# Patient Record
Sex: FEMALE | Race: WHITE | NOT HISPANIC OR LATINO
[De-identification: names, ages, dates, MRNs, and addresses within clinical notes are randomized per-mention and may not be internally consistent; named-entity substitution may affect disease eponyms.]

---

## 2017-03-23 ENCOUNTER — MEDICATION RENEWAL (OUTPATIENT)
Age: 77
End: 2017-03-23

## 2017-06-21 ENCOUNTER — MEDICATION RENEWAL (OUTPATIENT)
Age: 77
End: 2017-06-21

## 2017-09-20 ENCOUNTER — LABORATORY RESULT (OUTPATIENT)
Age: 77
End: 2017-09-20

## 2017-09-20 ENCOUNTER — APPOINTMENT (OUTPATIENT)
Dept: INTERNAL MEDICINE | Facility: CLINIC | Age: 77
End: 2017-09-20
Payer: MEDICARE

## 2017-09-20 VITALS
SYSTOLIC BLOOD PRESSURE: 124 MMHG | DIASTOLIC BLOOD PRESSURE: 70 MMHG | BODY MASS INDEX: 27.46 KG/M2 | HEIGHT: 63 IN | DIASTOLIC BLOOD PRESSURE: 72 MMHG | SYSTOLIC BLOOD PRESSURE: 118 MMHG | WEIGHT: 155 LBS

## 2017-09-20 DIAGNOSIS — R35.0 FREQUENCY OF MICTURITION: ICD-10-CM

## 2017-09-20 PROCEDURE — 36415 COLL VENOUS BLD VENIPUNCTURE: CPT

## 2017-09-20 PROCEDURE — 99214 OFFICE O/P EST MOD 30 MIN: CPT | Mod: 25

## 2017-09-20 RX ORDER — CETIRIZINE HYDROCHLORIDE 10 MG/1
10 CAPSULE, LIQUID FILLED ORAL
Refills: 0 | Status: ACTIVE | COMMUNITY

## 2017-09-22 LAB
25(OH)D3 SERPL-MCNC: 38 NG/ML
ALBUMIN SERPL ELPH-MCNC: 4 G/DL
ALP BLD-CCNC: 48 U/L
ALT SERPL-CCNC: 14 U/L
ANION GAP SERPL CALC-SCNC: 11 MMOL/L
AST SERPL-CCNC: 14 U/L
B BURGDOR IGG+IGM SER QL IB: NORMAL
BASOPHILS # BLD AUTO: 0.07 K/UL
BASOPHILS NFR BLD AUTO: 1.2 %
BILIRUB SERPL-MCNC: 0.3 MG/DL
BUN SERPL-MCNC: 13 MG/DL
CALCIUM SERPL-MCNC: 10 MG/DL
CCP AB SER IA-ACNC: <8 UNITS
CHLORIDE SERPL-SCNC: 101 MMOL/L
CHOLEST SERPL-MCNC: 251 MG/DL
CHOLEST/HDLC SERPL: 5.8 RATIO
CO2 SERPL-SCNC: 25 MMOL/L
CREAT SERPL-MCNC: 0.87 MG/DL
EOSINOPHIL # BLD AUTO: 0.22 K/UL
EOSINOPHIL NFR BLD AUTO: 3.7 %
GLUCOSE SERPL-MCNC: 90 MG/DL
HBA1C MFR BLD HPLC: 5.6 %
HCT VFR BLD CALC: 42.4 %
HDLC SERPL-MCNC: 43 MG/DL
HGB BLD-MCNC: 13.7 G/DL
IMM GRANULOCYTES NFR BLD AUTO: 0.2 %
LDLC SERPL CALC-MCNC: 177 MG/DL
LYMPHOCYTES # BLD AUTO: 1.41 K/UL
LYMPHOCYTES NFR BLD AUTO: 23.8 %
MAN DIFF?: NORMAL
MCHC RBC-ENTMCNC: 29.5 PG
MCHC RBC-ENTMCNC: 32.3 GM/DL
MCV RBC AUTO: 91.2 FL
MONOCYTES # BLD AUTO: 0.54 K/UL
MONOCYTES NFR BLD AUTO: 9.1 %
NEUTROPHILS # BLD AUTO: 3.68 K/UL
NEUTROPHILS NFR BLD AUTO: 62 %
PLATELET # BLD AUTO: 236 K/UL
POTASSIUM SERPL-SCNC: 4.3 MMOL/L
PROT SERPL-MCNC: 7 G/DL
RBC # BLD: 4.65 M/UL
RBC # FLD: 15.1 %
RF+CCP IGG SER-IMP: NEGATIVE
RHEUMATOID FACT SER QL: <7 IU/ML
SAVE SPECIMEN: NORMAL
SODIUM SERPL-SCNC: 137 MMOL/L
T3RU NFR SERPL: 1.07 INDEX
T4 SERPL-MCNC: 8.2 UG/DL
TRIGL SERPL-MCNC: 156 MG/DL
TSH SERPL-ACNC: 3.42 UIU/ML
URATE SERPL-MCNC: 5.2 MG/DL
WBC # FLD AUTO: 5.93 K/UL

## 2017-10-16 ENCOUNTER — MEDICATION RENEWAL (OUTPATIENT)
Age: 77
End: 2017-10-16

## 2017-10-17 ENCOUNTER — RX RENEWAL (OUTPATIENT)
Age: 77
End: 2017-10-17

## 2017-10-26 ENCOUNTER — MED ADMIN CHARGE (OUTPATIENT)
Age: 77
End: 2017-10-26

## 2017-10-26 ENCOUNTER — APPOINTMENT (OUTPATIENT)
Dept: INTERNAL MEDICINE | Facility: CLINIC | Age: 77
End: 2017-10-26
Payer: MEDICARE

## 2017-10-26 PROCEDURE — 90686 IIV4 VACC NO PRSV 0.5 ML IM: CPT

## 2017-10-26 PROCEDURE — G0008: CPT

## 2017-11-03 ENCOUNTER — APPOINTMENT (OUTPATIENT)
Dept: INTERNAL MEDICINE | Facility: CLINIC | Age: 77
End: 2017-11-03

## 2017-12-06 ENCOUNTER — MEDICATION RENEWAL (OUTPATIENT)
Age: 77
End: 2017-12-06

## 2018-01-30 ENCOUNTER — MEDICATION RENEWAL (OUTPATIENT)
Age: 78
End: 2018-01-30

## 2018-02-13 ENCOUNTER — RECORD ABSTRACTING (OUTPATIENT)
Age: 78
End: 2018-02-13

## 2018-02-13 DIAGNOSIS — Z87.09 PERSONAL HISTORY OF OTHER DISEASES OF THE RESPIRATORY SYSTEM: ICD-10-CM

## 2018-02-13 DIAGNOSIS — Z87.898 PERSONAL HISTORY OF OTHER SPECIFIED CONDITIONS: ICD-10-CM

## 2018-02-23 ENCOUNTER — NON-APPOINTMENT (OUTPATIENT)
Age: 78
End: 2018-02-23

## 2018-02-23 ENCOUNTER — LABORATORY RESULT (OUTPATIENT)
Age: 78
End: 2018-02-23

## 2018-02-23 ENCOUNTER — APPOINTMENT (OUTPATIENT)
Dept: INTERNAL MEDICINE | Facility: CLINIC | Age: 78
End: 2018-02-23
Payer: MEDICARE

## 2018-02-23 ENCOUNTER — MEDICATION RENEWAL (OUTPATIENT)
Age: 78
End: 2018-02-23

## 2018-02-23 VITALS
DIASTOLIC BLOOD PRESSURE: 70 MMHG | BODY MASS INDEX: 28.7 KG/M2 | SYSTOLIC BLOOD PRESSURE: 122 MMHG | WEIGHT: 162 LBS | HEIGHT: 63 IN

## 2018-02-23 VITALS — SYSTOLIC BLOOD PRESSURE: 120 MMHG | DIASTOLIC BLOOD PRESSURE: 60 MMHG

## 2018-02-23 DIAGNOSIS — Z00.00 ENCOUNTER FOR GENERAL ADULT MEDICAL EXAMINATION W/OUT ABNORMAL FINDINGS: ICD-10-CM

## 2018-02-23 LAB
BASOPHILS # BLD AUTO: 0.09 K/UL
BASOPHILS NFR BLD AUTO: 1.5 %
BILIRUB UR QL STRIP: NORMAL
CLARITY UR: CLEAR
COLLECTION METHOD: NORMAL
EOSINOPHIL # BLD AUTO: 0.11 K/UL
EOSINOPHIL NFR BLD AUTO: 1.9 %
GLUCOSE UR-MCNC: NORMAL
HCG UR QL: 0.2 EU/DL
HCT VFR BLD CALC: 43.3 %
HGB BLD-MCNC: 13.6 G/DL
HGB UR QL STRIP.AUTO: NORMAL
IMM GRANULOCYTES NFR BLD AUTO: 0.3 %
KETONES UR-MCNC: NORMAL
LEUKOCYTE ESTERASE UR QL STRIP: NORMAL
LYMPHOCYTES # BLD AUTO: 1.36 K/UL
LYMPHOCYTES NFR BLD AUTO: 22.9 %
MAN DIFF?: NORMAL
MCHC RBC-ENTMCNC: 28.5 PG
MCHC RBC-ENTMCNC: 31.4 GM/DL
MCV RBC AUTO: 90.8 FL
MONOCYTES # BLD AUTO: 0.45 K/UL
MONOCYTES NFR BLD AUTO: 7.6 %
NEUTROPHILS # BLD AUTO: 3.9 K/UL
NEUTROPHILS NFR BLD AUTO: 65.8 %
NITRITE UR QL STRIP: NORMAL
PH UR STRIP: 6
PLATELET # BLD AUTO: 243 K/UL
PROT UR STRIP-MCNC: NORMAL
RBC # BLD: 4.77 M/UL
RBC # FLD: 15.1 %
SP GR UR STRIP: 1.01
WBC # FLD AUTO: 5.93 K/UL

## 2018-02-23 PROCEDURE — 81003 URINALYSIS AUTO W/O SCOPE: CPT | Mod: QW

## 2018-02-23 PROCEDURE — 36415 COLL VENOUS BLD VENIPUNCTURE: CPT

## 2018-02-23 PROCEDURE — 93000 ELECTROCARDIOGRAM COMPLETE: CPT

## 2018-02-23 PROCEDURE — G0439: CPT | Mod: GA

## 2018-02-23 RX ORDER — DULOXETINE HYDROCHLORIDE 30 MG/1
30 CAPSULE, DELAYED RELEASE PELLETS ORAL
Qty: 90 | Refills: 0 | Status: DISCONTINUED | COMMUNITY
Start: 2017-09-20 | End: 2018-02-23

## 2018-02-24 LAB
25(OH)D3 SERPL-MCNC: 35.8 NG/ML
ALBUMIN SERPL ELPH-MCNC: 4.2 G/DL
ALP BLD-CCNC: 49 U/L
ALT SERPL-CCNC: 19 U/L
ANION GAP SERPL CALC-SCNC: 15 MMOL/L
AST SERPL-CCNC: 18 U/L
BILIRUB SERPL-MCNC: 0.4 MG/DL
BUN SERPL-MCNC: 16 MG/DL
CALCIUM SERPL-MCNC: 9.7 MG/DL
CHLORIDE SERPL-SCNC: 101 MMOL/L
CHOLEST SERPL-MCNC: 259 MG/DL
CHOLEST/HDLC SERPL: 6.6 RATIO
CO2 SERPL-SCNC: 23 MMOL/L
CREAT SERPL-MCNC: 0.91 MG/DL
GLUCOSE SERPL-MCNC: 116 MG/DL
HBA1C MFR BLD HPLC: 5.6 %
HDLC SERPL-MCNC: 39 MG/DL
LDLC SERPL CALC-MCNC: 182 MG/DL
POTASSIUM SERPL-SCNC: 4.6 MMOL/L
PROT SERPL-MCNC: 6.9 G/DL
SAVE SPECIMEN: NORMAL
SODIUM SERPL-SCNC: 139 MMOL/L
T3RU NFR SERPL: 1 INDEX
T4 SERPL-MCNC: 7.6 UG/DL
TRIGL SERPL-MCNC: 189 MG/DL
TSH SERPL-ACNC: 2.28 UIU/ML
URATE SERPL-MCNC: 5.5 MG/DL

## 2018-02-26 LAB — VIT B12 SERPL-MCNC: >2000 PG/ML

## 2018-03-19 ENCOUNTER — MEDICATION RENEWAL (OUTPATIENT)
Age: 78
End: 2018-03-19

## 2018-05-25 ENCOUNTER — MEDICATION RENEWAL (OUTPATIENT)
Age: 78
End: 2018-05-25

## 2018-07-16 ENCOUNTER — MEDICATION RENEWAL (OUTPATIENT)
Age: 78
End: 2018-07-16

## 2018-08-30 ENCOUNTER — APPOINTMENT (OUTPATIENT)
Dept: INTERNAL MEDICINE | Facility: CLINIC | Age: 78
End: 2018-08-30
Payer: MEDICARE

## 2018-08-30 VITALS
TEMPERATURE: 98.2 F | SYSTOLIC BLOOD PRESSURE: 150 MMHG | WEIGHT: 165 LBS | HEIGHT: 63 IN | DIASTOLIC BLOOD PRESSURE: 83 MMHG | BODY MASS INDEX: 29.23 KG/M2 | HEART RATE: 91 BPM

## 2018-08-30 DIAGNOSIS — J32.9 CHRONIC SINUSITIS, UNSPECIFIED: ICD-10-CM

## 2018-08-30 PROCEDURE — 99213 OFFICE O/P EST LOW 20 MIN: CPT

## 2018-08-30 NOTE — PHYSICAL EXAM
[No Acute Distress] : no acute distress [Well Nourished] : well nourished [Well Developed] : well developed [Well-Appearing] : well-appearing [No Respiratory Distress] : no respiratory distress  [Clear to Auscultation] : lungs were clear to auscultation bilaterally [No Accessory Muscle Use] : no accessory muscle use [Normal Rate] : normal rate  [Regular Rhythm] : with a regular rhythm [Normal S1, S2] : normal S1 and S2 [No Murmur] : no murmur heard [Soft] : abdomen soft [Non Tender] : non-tender [Non-distended] : non-distended [No Masses] : no abdominal mass palpated [No HSM] : no HSM [Normal Bowel Sounds] : normal bowel sounds [Normal Affect] : the affect was normal [Normal Insight/Judgement] : insight and judgment were intact [Normal Outer Ear/Nose] : the outer ears and nose were normal in appearance [Normal Oropharynx] : the oropharynx was normal

## 2018-08-30 NOTE — HISTORY OF PRESENT ILLNESS
[FreeTextEntry8] : 77 year old female here today with complaints of 2 weeks of feeling like something is stuck in her throat and a constant post nasal drip. She has a very bad taste in her mouth and smell in her nose. She has been using Vicks because her nose smells.   Denies fevers.  Denies chills. Denies body aches. She has not taken anything OTC.  Advil she takes at night for her back pain. \par She has a mild productive cough but that started a few days ago. She has been taking Flonase. \par She doesn’t want to take medications. SHe is concerned with GI.  \par

## 2018-08-30 NOTE — ASSESSMENT
[FreeTextEntry1] : Sinusitis: does not want antibiotics or to take medications but recommend OTC agents like mucinex and Flonase\par

## 2018-08-30 NOTE — REVIEW OF SYSTEMS
[Negative] : Gastrointestinal [Nasal Discharge] : nasal discharge [Sore Throat] : no sore throat [Postnasal Drip] : postnasal drip

## 2018-10-01 ENCOUNTER — MEDICATION RENEWAL (OUTPATIENT)
Age: 78
End: 2018-10-01

## 2018-12-29 ENCOUNTER — TRANSCRIPTION ENCOUNTER (OUTPATIENT)
Age: 78
End: 2018-12-29

## 2019-02-04 ENCOUNTER — MEDICATION RENEWAL (OUTPATIENT)
Age: 79
End: 2019-02-04

## 2019-02-11 ENCOUNTER — MEDICATION RENEWAL (OUTPATIENT)
Age: 79
End: 2019-02-11

## 2019-03-29 ENCOUNTER — LABORATORY RESULT (OUTPATIENT)
Age: 79
End: 2019-03-29

## 2019-03-29 ENCOUNTER — NON-APPOINTMENT (OUTPATIENT)
Age: 79
End: 2019-03-29

## 2019-03-29 ENCOUNTER — APPOINTMENT (OUTPATIENT)
Dept: INTERNAL MEDICINE | Facility: CLINIC | Age: 79
End: 2019-03-29
Payer: MEDICARE

## 2019-03-29 VITALS
WEIGHT: 163 LBS | SYSTOLIC BLOOD PRESSURE: 120 MMHG | HEIGHT: 63 IN | DIASTOLIC BLOOD PRESSURE: 70 MMHG | BODY MASS INDEX: 28.88 KG/M2

## 2019-03-29 VITALS — DIASTOLIC BLOOD PRESSURE: 80 MMHG | SYSTOLIC BLOOD PRESSURE: 120 MMHG

## 2019-03-29 VITALS — SYSTOLIC BLOOD PRESSURE: 120 MMHG | DIASTOLIC BLOOD PRESSURE: 70 MMHG

## 2019-03-29 DIAGNOSIS — G47.00 INSOMNIA, UNSPECIFIED: ICD-10-CM

## 2019-03-29 PROCEDURE — G0439: CPT

## 2019-03-29 PROCEDURE — 36415 COLL VENOUS BLD VENIPUNCTURE: CPT

## 2019-03-29 PROCEDURE — 81003 URINALYSIS AUTO W/O SCOPE: CPT | Mod: QW

## 2019-03-29 PROCEDURE — 93000 ELECTROCARDIOGRAM COMPLETE: CPT | Mod: 59

## 2019-03-29 PROCEDURE — 99213 OFFICE O/P EST LOW 20 MIN: CPT | Mod: 25

## 2019-03-29 RX ORDER — AZITHROMYCIN 250 MG/1
250 TABLET, FILM COATED ORAL
Qty: 5 | Refills: 0 | Status: DISCONTINUED | COMMUNITY
Start: 2018-08-30 | End: 2019-03-29

## 2019-03-29 NOTE — ASSESSMENT
[FreeTextEntry1] : This is a 78-year-old female reviewed above\par \par She has a history of chronic joint pain mostly in her back in the coccyx region and in her fingers. She has been worked up serologically and has been negative she refuses medication. I will suggest a rheumatologic consultation\par \par She has had a increase in her fatigability this is rather nonspecific her examination and the rest of her history is benign no intervention\par \par She has had a school change in her bowel habits she is guaiac negative she had a colonoscopy 2 years ago. I told her if this persists or becomes worse to go back to her gastroenterologist\par \par She is up-to-date with vaccinations except that she needs any new shingle shot\par \par She has a history of hypercholesterolemia the cholesterol profile has been obtained she is intolerant and 78 she has had no evidence of vascular disease no intervention We could consider Zetia if necessary\par \par In terms of her insomnia she will continue on her current regime\par \par She should get a new shingle shot\par \par I will hold her she continues on her PPI with good success no change\par \par He did have a rash in the past this has dissipated she remains on Zyrtec

## 2019-03-29 NOTE — HISTORY OF PRESENT ILLNESS
[FreeTextEntry1] : This is a 78-year-old female for annual health assessment [de-identified] : Specifically we will address her history of hypercholesterolemia reflux lumbar radiculopathy moderate white regurgitation statin intolerance\par \par She has had a boiled change in bowel habits and also is complaining of more easy fatigability without chest pain shortness of breath or diaphoresis

## 2019-03-29 NOTE — PHYSICAL EXAM

## 2019-03-29 NOTE — HEALTH RISK ASSESSMENT
[Good] : ~his/her~  mood as  good [No falls in past year] : Patient reported no falls in the past year [0] : 1) Little interest or pleasure doing things: Not at all (0) [With Significant Other] : lives with significant other [Retired] : retired [High School] : high school [Significant Other] : lives with significant other [Feels Safe at Home] : Feels safe at home [Fully functional (bathing, dressing, toileting, transferring, walking, feeding)] : Fully functional (bathing, dressing, toileting, transferring, walking, feeding) [Fully functional (using the telephone, shopping, preparing meals, housekeeping, doing laundry, using] : Fully functional and needs no help or supervision to perform IADLs (using the telephone, shopping, preparing meals, housekeeping, doing laundry, using transportation, managing medications and managing finances) [Smoke Detector] : smoke detector [Carbon Monoxide Detector] : carbon monoxide detector [Seat Belt] :  uses seat belt [Sunscreen] : uses sunscreen [I will adhere to the patient's wishes as expressed in the advance directive except as noted below.] : I will adhere to the patient's wishes as expressed in the advance directive except as noted below [] : No [Sexually Active] : not sexually active [Reports changes in hearing] : Reports no changes in hearing [Reports changes in vision] : Reports no changes in vision [Reports changes in dental health] : Reports no changes in dental health [Guns at Home] : no guns at home [Travel to Developing Areas] : does not  travel to developing areas [TB Exposure] : is not being exposed to tuberculosis [Caregiver Concerns] : does not have caregiver concerns [FreeTextEntry4] : Daughter is health care proxy

## 2019-04-01 LAB
ALBUMIN SERPL ELPH-MCNC: 4.2 G/DL
ALP BLD-CCNC: 50 U/L
ALT SERPL-CCNC: 18 U/L
ANION GAP SERPL CALC-SCNC: 15 MMOL/L
AST SERPL-CCNC: 17 U/L
BASOPHILS # BLD AUTO: 0.11 K/UL
BASOPHILS NFR BLD AUTO: 1.9 %
BILIRUB SERPL-MCNC: 0.4 MG/DL
BUN SERPL-MCNC: 15 MG/DL
CALCIUM SERPL-MCNC: 10.1 MG/DL
CHLORIDE SERPL-SCNC: 102 MMOL/L
CHOLEST SERPL-MCNC: 229 MG/DL
CHOLEST/HDLC SERPL: 6.4 RATIO
CO2 SERPL-SCNC: 26 MMOL/L
CREAT SERPL-MCNC: 0.87 MG/DL
EOSINOPHIL # BLD AUTO: 0.11 K/UL
EOSINOPHIL NFR BLD AUTO: 1.9 %
ESTIMATED AVERAGE GLUCOSE: 123 MG/DL
GLUCOSE SERPL-MCNC: 91 MG/DL
HBA1C MFR BLD HPLC: 5.9 %
HCT VFR BLD CALC: 45.7 %
HDLC SERPL-MCNC: 36 MG/DL
HGB BLD-MCNC: 14.1 G/DL
IMM GRANULOCYTES NFR BLD AUTO: 0.2 %
LDLC SERPL CALC-MCNC: 167 MG/DL
LYMPHOCYTES # BLD AUTO: 1.07 K/UL
LYMPHOCYTES NFR BLD AUTO: 18.4 %
MAN DIFF?: NORMAL
MCHC RBC-ENTMCNC: 28.6 PG
MCHC RBC-ENTMCNC: 30.9 GM/DL
MCV RBC AUTO: 92.7 FL
MONOCYTES # BLD AUTO: 0.51 K/UL
MONOCYTES NFR BLD AUTO: 8.8 %
NEUTROPHILS # BLD AUTO: 4 K/UL
NEUTROPHILS NFR BLD AUTO: 68.8 %
PLATELET # BLD AUTO: 259 K/UL
POTASSIUM SERPL-SCNC: 4.7 MMOL/L
PROT SERPL-MCNC: 6.8 G/DL
RBC # BLD: 4.93 M/UL
RBC # FLD: 14 %
SAVE SPECIMEN: NORMAL
SODIUM SERPL-SCNC: 142 MMOL/L
T3RU NFR SERPL: 1 TBI
T4 SERPL-MCNC: 8.2 UG/DL
TRIGL SERPL-MCNC: 129 MG/DL
TSH SERPL-ACNC: 2.12 UIU/ML
URATE SERPL-MCNC: 5.5 MG/DL
WBC # FLD AUTO: 5.81 K/UL

## 2019-04-02 LAB — 25(OH)D3 SERPL-MCNC: 42.9 NG/ML

## 2019-05-15 ENCOUNTER — MEDICATION RENEWAL (OUTPATIENT)
Age: 79
End: 2019-05-15

## 2019-06-20 ENCOUNTER — MEDICATION RENEWAL (OUTPATIENT)
Age: 79
End: 2019-06-20

## 2019-06-21 ENCOUNTER — MEDICATION RENEWAL (OUTPATIENT)
Age: 79
End: 2019-06-21

## 2019-07-26 ENCOUNTER — MEDICATION RENEWAL (OUTPATIENT)
Age: 79
End: 2019-07-26

## 2019-08-19 ENCOUNTER — MEDICATION RENEWAL (OUTPATIENT)
Age: 79
End: 2019-08-19

## 2019-08-22 ENCOUNTER — MEDICATION RENEWAL (OUTPATIENT)
Age: 79
End: 2019-08-22

## 2019-10-11 ENCOUNTER — CLINICAL ADVICE (OUTPATIENT)
Age: 79
End: 2019-10-11

## 2019-10-16 ENCOUNTER — APPOINTMENT (OUTPATIENT)
Dept: ORTHOPEDIC SURGERY | Facility: CLINIC | Age: 79
End: 2019-10-16
Payer: MEDICARE

## 2019-10-16 VITALS
WEIGHT: 156 LBS | SYSTOLIC BLOOD PRESSURE: 141 MMHG | HEART RATE: 84 BPM | DIASTOLIC BLOOD PRESSURE: 88 MMHG | HEIGHT: 63 IN | BODY MASS INDEX: 27.64 KG/M2

## 2019-10-16 DIAGNOSIS — F19.90 OTHER PSYCHOACTIVE SUBSTANCE USE, UNSPECIFIED, UNCOMPLICATED: ICD-10-CM

## 2019-10-16 PROCEDURE — 72100 X-RAY EXAM L-S SPINE 2/3 VWS: CPT

## 2019-10-16 PROCEDURE — 99204 OFFICE O/P NEW MOD 45 MIN: CPT

## 2019-10-16 NOTE — PHYSICAL EXAM
[UE/LE] : Sensory: Intact in bilateral upper & lower extremities [ALL] : dorsalis pedis, posterior tibial, femoral, popliteal, and radial 2+ and symmetric bilaterally [Normal] : Gait: normal [SLR] : negative straight leg raise [Poor Appearance] : well-appearing [Acute Distress] : not in acute distress [de-identified] : 5 out of 5 motor strength, sensation is intact and symmetrical full range of motion flexion extension and rotation, no palpatory tenderness full range of motion of hips knees shoulders and elbows (all four extremities), no atrophy, negative straight leg raise, no pathological reflexes, no swelling, normal ambulation, no apparent distress skin is intact, no palpable lymph nodes, no upper or lower extremity instability, alert and oriented x3 and normal mood. Normal finger-to nose test. \par  [de-identified] : AP/lat lumbar 10/16/2019: Spondylolisthesis at L4/5 -reviewed with the patient. \par \par MRI lumbar 2015 at [facility]: Broad posterior disc herniation at L1-2 and L4-5 and there is a focal central component of he herniation L4-5. Posterior annular bulges L2-3, L3-4 and L5-S1. 1-2 mm retrolisthesis at L1-2 and L2-3 and there is a 1-2 mm anterolisthesis at l4-5. \par \par

## 2019-10-16 NOTE — DISCUSSION/SUMMARY
[de-identified] : Right-sided lumbar radiculopathy.\par We discussed all options. \par MDP. \par If no better in 2 weeks, will send for new MRI lumbar. \par All options discussed including rest, medicine, home exercise, acupuncture, Chiropractic care, Physical Therapy, Pain management, and last resort surgery. \par All questions were answered, all alternatives discussed and the patient is in complete agreement with that plan. Follow-up appointment as instructed. Any issues and the patient will call or come in sooner. \par Thank you for the referral.

## 2019-10-16 NOTE — ADDENDUM
[FreeTextEntry1] :  This note was authored by Aparna Wilcox working as a medical scribe for Dr. Osmar Newman. The note was reviewed, edited, and revised by Dr. Osmar Newman whom is in agreement with the exam findings, imaging findings, and treatment plan. Oct 16, 2019

## 2019-10-16 NOTE — HISTORY OF PRESENT ILLNESS
[6] : an average pain level of 6/10 [Pain Location] : pain [Stable] : stable [de-identified] : Patient is a 78 year old female presents with history low back pain for about 51 years. Patient reports the low back pain/"coccyx" pain exacerbated 2 weeks ago. \par Has been pedaling stationary bike 2x/day for about 2 weeks, which she thinks might have exacerbated the last flare-up. \par Pain radiates down the right leg. \par Moving around, cooking, walking up and down the stairs worsen the pain.\par Takes advil for pain as needed, which helps temporarily.\par Also reports tingling in the right leg that comes and goes. \par She also reports history of neck pain and thoracic-back pain. \par No prior PT.\par No prior lumbar injections. \par Had cervical spine injections about 10 yrs ago, which helped. \par X-ray April 2015. \par Prior MRI lumbar 2015. \par No fever chills sweats nausea vomiting no bowel or bladder dysfunction, no recent weight loss or gain no night pain. This history is in addition to the intake form that I personally reviewed.

## 2019-10-28 ENCOUNTER — OTHER (OUTPATIENT)
Age: 79
End: 2019-10-28

## 2019-10-30 ENCOUNTER — MEDICATION RENEWAL (OUTPATIENT)
Age: 79
End: 2019-10-30

## 2019-10-30 ENCOUNTER — OTHER (OUTPATIENT)
Age: 79
End: 2019-10-30

## 2019-11-04 ENCOUNTER — APPOINTMENT (OUTPATIENT)
Dept: ORTHOPEDIC SURGERY | Facility: CLINIC | Age: 79
End: 2019-11-04
Payer: MEDICARE

## 2019-11-04 ENCOUNTER — RX RENEWAL (OUTPATIENT)
Age: 79
End: 2019-11-04

## 2019-11-04 PROCEDURE — 99214 OFFICE O/P EST MOD 30 MIN: CPT

## 2019-11-04 NOTE — DISCUSSION/SUMMARY
[de-identified] : Right-sided lumbar radiculopathy.\par We discussed all options. \par Voltaren and PT\par If no better in 3-4 weeks, injection.  \par All options discussed including rest, medicine, home exercise, acupuncture, Chiropractic care, Physical Therapy, Pain management, and last resort surgery. \par All questions were answered, all alternatives discussed and the patient is in complete agreement with that plan. Follow-up appointment as instructed. Any issues and the patient will call or come in sooner. \par Thank you for the referral.

## 2019-11-04 NOTE — HISTORY OF PRESENT ILLNESS
[Pain Location] : pain [Stable] : stable [6] : an average pain level of 6/10 [de-identified] : 78 year old female returns for MRI results lumbar spine \par Medrol Dose Leonard prescribed previous visit no relief \par Pain is sever, excruciating for the past 48 hrs \par Moving around, cooking, walking up and down the stairs worsen the pain.\par Takes advil for pain every 4 hrs, no rlief \par Also reports tingling in the right leg that comes and goes. \par She also reports history of neck pain and thoracic-back pain. \par No prior PT.\par No prior lumbar injections. \par Had cervical spine injections about 10 yrs ago, which helped. \par X-ray April 2015. \par Prior MRI lumbar 2015. \par No fever chills sweats nausea vomiting no bowel or bladder dysfunction, no recent weight loss or gain no night pain. This history is in addition to the intake form that I personally reviewed.

## 2019-11-04 NOTE — PHYSICAL EXAM
[UE/LE] : Sensory: Intact in bilateral upper & lower extremities [ALL] : dorsalis pedis, posterior tibial, femoral, popliteal, and radial 2+ and symmetric bilaterally [Normal] : Oriented to person, place, and time, insight and judgement were intact and the affect was normal [SLR] : negative straight leg raise [Poor Appearance] : well-appearing [Acute Distress] : not in acute distress [de-identified] : 5 out of 5 motor strength, sensation is intact and symmetrical full range of motion flexion extension and rotation, no palpatory tenderness full range of motion of hips knees shoulders and elbows (all four extremities), no atrophy, negative straight leg raise, no pathological reflexes, no swelling, normal ambulation, no apparent distress skin is intact, no palpable lymph nodes, no upper or lower extremity instability, alert and oriented x3 and normal mood. Normal finger-to nose test. \par  [de-identified] : MRI lumbar spine  11/02/2019 [facility] shows:\par 1) broad posterior disc herniations at L1/2 and L4/L5 and there is a focal central component of the disc herniation L4/L5\par 2) POsterior annular disc bulges L2/L#, L3/L4 and L5/S1, Shallow peripheral left-side disc herniation L5/S1 with left foraminal extension and narrowing. Multilevel foraminal extension and narrowing with nerve root encroachment as enumerated \par 3) 1-2 mm retrolisthesis at L1/L2 and L2/L# and there is a 1-2 mm anterolisthesis at L4/L%. \par Scoliosis convex to the left. \par \par See below.\par \par \par \par \par AP/lat lumbar 10/16/2019: Spondylolisthesis at L4/5 -reviewed with the patient. \par \par MRI lumbar 2015 at [facility]: Broad posterior disc herniation at L1-2 and L4-5 and there is a focal central component of he herniation L4-5. Posterior annular bulges L2-3, L3-4 and L5-S1. 1-2 mm retrolisthesis at L1-2 and L2-3 and there is a 1-2 mm anterolisthesis at L4-5. \par \par

## 2019-11-04 NOTE — REASON FOR VISIT
[Initial Consultation] : an initial consultation for [Back Pain] : back pain [Follow-Up Visit] : a follow-up visit for

## 2019-11-13 ENCOUNTER — CLINICAL ADVICE (OUTPATIENT)
Age: 79
End: 2019-11-13

## 2019-11-15 ENCOUNTER — CHART COPY (OUTPATIENT)
Age: 79
End: 2019-11-15

## 2019-12-03 ENCOUNTER — APPOINTMENT (OUTPATIENT)
Dept: NEUROSURGERY | Facility: CLINIC | Age: 79
End: 2019-12-03

## 2019-12-04 ENCOUNTER — OTHER (OUTPATIENT)
Age: 79
End: 2019-12-04

## 2020-02-17 ENCOUNTER — EMERGENCY (EMERGENCY)
Facility: HOSPITAL | Age: 80
LOS: 1 days | Discharge: HOME | End: 2020-02-17
Attending: STUDENT IN AN ORGANIZED HEALTH CARE EDUCATION/TRAINING PROGRAM
Payer: MEDICARE

## 2020-02-17 VITALS
RESPIRATION RATE: 18 BRPM | HEART RATE: 89 BPM | DIASTOLIC BLOOD PRESSURE: 75 MMHG | WEIGHT: 160.06 LBS | OXYGEN SATURATION: 97 % | SYSTOLIC BLOOD PRESSURE: 135 MMHG | TEMPERATURE: 97 F | HEIGHT: 63 IN

## 2020-02-17 LAB
APPEARANCE UR: ABNORMAL
BACTERIA # UR AUTO: ABNORMAL
BILIRUB UR-MCNC: NEGATIVE — SIGNIFICANT CHANGE UP
COLOR SPEC: YELLOW — SIGNIFICANT CHANGE UP
DIFF PNL FLD: NEGATIVE — SIGNIFICANT CHANGE UP
EPI CELLS # UR: 18 /HPF — HIGH
GLUCOSE UR QL: NEGATIVE — SIGNIFICANT CHANGE UP
HYALINE CASTS # UR AUTO: 7 /LPF — HIGH (ref 0–2)
KETONES UR-MCNC: NEGATIVE — SIGNIFICANT CHANGE UP
LEUKOCYTE ESTERASE UR-ACNC: ABNORMAL
NITRITE UR-MCNC: POSITIVE
PH UR: 6 — SIGNIFICANT CHANGE UP (ref 5–8)
PROT UR-MCNC: ABNORMAL
RBC CASTS # UR COMP ASSIST: 4 /HPF — SIGNIFICANT CHANGE UP (ref 0–4)
SP GR SPEC: 1.02 — SIGNIFICANT CHANGE UP (ref 1.01–1.02)
UROBILINOGEN FLD QL: NEGATIVE — SIGNIFICANT CHANGE UP
WBC UR QL: 26 /HPF — HIGH (ref 0–5)

## 2020-02-17 PROCEDURE — 81001 URINALYSIS AUTO W/SCOPE: CPT

## 2020-02-17 PROCEDURE — 99283 EMERGENCY DEPT VISIT LOW MDM: CPT

## 2020-02-17 PROCEDURE — 73562 X-RAY EXAM OF KNEE 3: CPT | Mod: 26,RT

## 2020-02-17 PROCEDURE — 87186 SC STD MICRODIL/AGAR DIL: CPT

## 2020-02-17 PROCEDURE — 73562 X-RAY EXAM OF KNEE 3: CPT

## 2020-02-17 PROCEDURE — 87086 URINE CULTURE/COLONY COUNT: CPT

## 2020-02-17 RX ORDER — NITROFURANTOIN MACROCRYSTAL 50 MG
1 CAPSULE ORAL
Qty: 10 | Refills: 0
Start: 2020-02-17 | End: 2020-02-21

## 2020-02-17 RX ORDER — DIAZEPAM 5 MG
5 TABLET ORAL ONCE
Refills: 0 | Status: DISCONTINUED | OUTPATIENT
Start: 2020-02-17 | End: 2020-02-17

## 2020-02-17 RX ORDER — NITROFURANTOIN MACROCRYSTAL 50 MG
100 CAPSULE ORAL ONCE
Refills: 0 | Status: COMPLETED | OUTPATIENT
Start: 2020-02-17 | End: 2020-02-17

## 2020-02-17 RX ORDER — ACETAMINOPHEN 500 MG
975 TABLET ORAL ONCE
Refills: 0 | Status: COMPLETED | OUTPATIENT
Start: 2020-02-17 | End: 2020-02-17

## 2020-02-17 RX ORDER — DIAZEPAM 5 MG
1 TABLET ORAL
Qty: 8 | Refills: 0
Start: 2020-02-17 | End: 2020-02-18

## 2020-02-17 RX ORDER — ONDANSETRON 8 MG/1
4 TABLET, FILM COATED ORAL ONCE
Refills: 0 | Status: COMPLETED | OUTPATIENT
Start: 2020-02-17 | End: 2020-02-17

## 2020-02-17 RX ORDER — LIDOCAINE 4 G/100G
1 CREAM TOPICAL ONCE
Refills: 0 | Status: COMPLETED | OUTPATIENT
Start: 2020-02-17 | End: 2020-02-17

## 2020-02-17 RX ADMIN — Medication 975 MILLIGRAM(S): at 19:56

## 2020-02-17 RX ADMIN — Medication 100 MILLIGRAM(S): at 22:13

## 2020-02-17 RX ADMIN — Medication 5 MILLIGRAM(S): at 19:56

## 2020-02-17 RX ADMIN — ONDANSETRON 4 MILLIGRAM(S): 8 TABLET, FILM COATED ORAL at 19:57

## 2020-02-17 NOTE — ED PROVIDER NOTE - PATIENT PORTAL LINK FT
You can access the FollowMyHealth Patient Portal offered by [facility] by registering at the following website: [email or web address]. By joining [facility]’s FollowMyHealth portal, you will also be able to view your health information using other applications (apps) compatible with our system.

## 2020-02-17 NOTE — ED PROVIDER NOTE - NSFOLLOWUPINSTRUCTIONS_ED_ALL_ED_FT
- stay hydrated.   - take tylenol 975mg and ibuprofen 600mg every 6 hours as needed for pain-take with meals.  -take valium 5 mg every 6 hours as needed for severe pain----take caution as this can increase your risk of falling, DO NOT take before driving or with pain medications.        ---do no take neproxen or metaxalone if you are taking valium/motrin.----  - follow up with your pcp in 1-2 days.    - follow up with our spine center at [phone] to make an appointment this week, or follow up with your spine specialist    - return if symptoms worsen, fever, weakness, numbness/tingling, blurred vision, difficulty ambulating and all other concerns. - stay hydrated.   - take tylenol 975mg every 6 hours and naproxen 250 mg every 12 hours as needed for pain-take with meals.  -take valium 5 mg every 6 hours as needed for severe pain----take caution as this can increase your risk of falling, DO NOT take before driving or with pain medications.        --- metaxalone if you are taking valium, do not take your alprazolam if you are taking valium---    - follow up with your pcp in 1-2 days.    - follow up with our spine center at [phone] to make an appointment this week, or follow up with your spine specialist this week.     - return if symptoms worsen, fever, weakness, numbness/tingling, blurred vision, difficulty ambulating and all other concerns. - stay hydrated.   - take tylenol 975mg every 6 hours and naproxen 250 mg every 12 hours as needed for pain-take with meals.  -take valium 5 mg every 6 hours as needed for severe pain----take caution as this can increase your risk of falling, DO NOT take before driving or with pain medications.        --- metaxalone if you are taking valium, do not take your alprazolam if you are taking valium---    -take macrobid as prescribed for UTI    - follow up with your pcp in 1-2 days.    - follow up with our spine center at [phone] to make an appointment this week, or follow up with your spine specialist this week.     - return if symptoms worsen, fever, weakness, numbness/tingling, blurred vision, difficulty ambulating and all other concerns.

## 2020-02-17 NOTE — ED PROVIDER NOTE - ATTENDING CONTRIBUTION TO CARE
Attending MD Coulter:   I personally have seen and examined this patient.  ACP, Resident, medical student note reviewed and agree on plan of care and except where noted.     79y F PMH HLD, sciatica brought in by  for right knee pain. Patient has chronic back pain, MRIs have shown herniated discs and spinal stenosis, has had spinal injections in past, last one November 2019. For past week patient has had flare up of back pain, was seen by urgent care and prescribed metaxalone and naproxen with limited relief. For past 4 days patient reports severe right knee pain, worse with movement, not relieved by oral medications. Has known arthritis in the joint. Denies saddle anesthesia, bowel or bladder incontinence or retention, history of cancer, history of fever, history of trauma, fever or chills.    On exam patient is well appearing, vitals wnl, rrr s1s2, lungs clear, abdomen soft, right knee without erythema or swelling, no tenderness to palpation, full range of motion on extension and flexion, 5/5 strength, sensation intact, palpable distal pulses, lower back without erythema or swelling, no midline tenderness to palpation, non-ataxic gait.     Differential includes but is not limited to worsening arthritis, unlikely fracture in setting of no history of trauma, no suspicion at this time for septic joint, will obtain xrays, give symptomatic relief, likely dc home with appropriate referrals.

## 2020-02-17 NOTE — ED PROVIDER NOTE - OBJECTIVE STATEMENT
78 yo female with pmh hld, sciatica with disc herniations of L1-L5 presenting with sharp constant right buttock pain radiating down her leg to her 4th/5th toes, most of pain concentrated to her right knee x 4 days worse with movements, after making her bed. Pt has been taking naproxen and metaxalone with no relief for the past 4 days, last took naproxen today with no relief. endorses intermittent numbness to her 4th/5th toes of the right foot, which she has had for years. Denies recent fevers, chills, nausea, vomiting, no recent injury, no urinary/bowel incontinence, no saddle anesthesias, weakness, difficulty ambulating. + urinary frequency x weeks.

## 2020-02-17 NOTE — ED PROVIDER NOTE - RAPID ASSESSMENT
79y F presents to the ED today c/o R knee and leg pain for a few months. Endorses urinary frequency as well. Has seen Orthopedic surgeons with XR and MRI of L spine (pt has CD on her of imaging) which revealed herniated discs in L2/L3 and L3/L4. Options of epidural, surgery, pain medication, and PT were given to pt, but pt was reluctant to do surgery so they prescribed Naproxen 500mg Q12 hours and Metaxalone 800mg QHS and referred pt to PT. She had epidural done, which improved pain for 2 wks, and did PT as well. Pain recurred and worsened for 4d when she bent over to make her bed. Went to , who did minimal workup, and came to ED for intervention due to dissatisfaction with care at .     **Pt seen in waiting room by Gene Pabon (PA), documentation completed by Penelope Murrell. Pt to be sent to main ED for further evaluation - all orders placed to be followed by MD in the main ED**

## 2020-02-17 NOTE — ED ADULT NURSE NOTE - NSIMPLEMENTINTERV_GEN_ALL_ED
Implemented All Universal Safety Interventions:  [place] to call system. Call bell, personal items and telephone within reach. Instruct patient to call for assistance. Room bathroom lighting operational. Non-slip footwear when patient is off stretcher. Physically safe environment: no spills, clutter or unnecessary equipment. Stretcher in lowest position, wheels locked, appropriate side rails in place.

## 2020-02-17 NOTE — ED ADULT NURSE NOTE - OBJECTIVE STATEMENT
80 y/o female presents to ed c/o chronic back pain with herniated L1,L2 discs. States she has slight numbness intermittently in her right foot and pain to her right knee. Denies bowel or bladder inconstance, chest pain, sob, ha, n/v/d, abdominal pain, f/c, urinary symptoms, hematuria. A&Ox4, vss, skin warm dry and intact, MAEx4, lungs CTA, abd soft nondistended. Ambulates with cane. Pt resting comfortably with VSS, no complaints at this time. Patient's bed in the lowest position, explained plan of care to patient and family members. Will continue to reassess.

## 2020-02-17 NOTE — ED PROVIDER NOTE - PHYSICAL EXAMINATION
A&Ox3, NAD. NCAT. PERRL, EOMI. Neck supple, no LAD. Lungs CTAB. +S1S2, RRR, No m/r/g. Abd soft, NT/ND, +BS, no rebound or guarding. Extremities: cap refill <2, pulses in distal extremities 4+, no edema. Skin without rash. CN II-XII intact. Strength 5/5 UE/LE. Sensations intact throughout. Gait steady with cane. negative straight leg raise, +TTP of right buttock reproducing sciatic pain. dorsi/plantar flexion of BL feet 5/5 strength, strength to hip flexion 5/5. right knee with minimal TTP of lateral aspect of knee joint, no swelling, erythema, knees with FROM, neg, anterior/posterior drawer tests, mild tenderness with varus stress, neg valgus stress tests.

## 2020-02-19 NOTE — ED POST DISCHARGE NOTE - OTHER COMMUNICATION
2/20/20: Macrobid sensitive on final sensitivities. Appropriate care received in ED, no indication for further contact at this time. - Bola Morales PA-C

## 2020-02-25 ENCOUNTER — APPOINTMENT (OUTPATIENT)
Dept: ORTHOPEDIC SURGERY | Facility: CLINIC | Age: 80
End: 2020-02-25
Payer: MEDICARE

## 2020-02-25 VITALS
BODY MASS INDEX: 28.35 KG/M2 | HEART RATE: 90 BPM | WEIGHT: 160 LBS | SYSTOLIC BLOOD PRESSURE: 129 MMHG | HEIGHT: 63 IN | DIASTOLIC BLOOD PRESSURE: 90 MMHG

## 2020-02-25 PROCEDURE — 99215 OFFICE O/P EST HI 40 MIN: CPT

## 2020-02-28 ENCOUNTER — APPOINTMENT (OUTPATIENT)
Dept: NEUROSURGERY | Facility: CLINIC | Age: 80
End: 2020-02-28
Payer: MEDICARE

## 2020-02-28 VITALS
BODY MASS INDEX: 28.35 KG/M2 | DIASTOLIC BLOOD PRESSURE: 85 MMHG | SYSTOLIC BLOOD PRESSURE: 130 MMHG | HEART RATE: 74 BPM | WEIGHT: 160 LBS | HEIGHT: 63 IN

## 2020-02-28 DIAGNOSIS — M43.16 SPONDYLOLISTHESIS, LUMBAR REGION: ICD-10-CM

## 2020-02-28 PROCEDURE — 99203 OFFICE O/P NEW LOW 30 MIN: CPT

## 2020-02-28 RX ORDER — DICLOFENAC SODIUM 75 MG/1
75 TABLET, DELAYED RELEASE ORAL
Qty: 180 | Refills: 2 | Status: DISCONTINUED | COMMUNITY
Start: 2019-11-04 | End: 2020-02-28

## 2020-02-28 NOTE — HISTORY OF PRESENT ILLNESS
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

## 2020-02-28 NOTE — DATA REVIEWED
[de-identified] : MRI lumbar spine done 11/6/19 showed evidence of increasing right foraminal stenosis at L3-L4, mild right L3 compression at L2-L3, right lateral and foraminal disc herniation at L3-L4 with compression of the right L3 nerve root, grade 1 spondy at L4-L5 with central braod based disc herniation, mild to moderate left L5 and S1 compression with moderate bilateral facet arthritis at L5-S1

## 2020-02-28 NOTE — ASSESSMENT
[FreeTextEntry1] : 79 year old female with low back and lumbar radicular pain secondary to disc herniation at stenosis.  Given the nature of the patient's complaints and lack of significant improvement following more conservative measures, we did discuss lumbar epidural steroid injection.  Risks, benefits, and expectations of the procedure were reviewed.  The patient was provided with an educational pamphlet outlining the details of the procedure so that he/she may have the ability to review the information prior to proceeding.  The patient has agreed to proceed and will follow up with me for the procedure.\par

## 2020-03-01 ENCOUNTER — TRANSCRIPTION ENCOUNTER (OUTPATIENT)
Age: 80
End: 2020-03-01

## 2020-03-02 ENCOUNTER — OUTPATIENT (OUTPATIENT)
Dept: OUTPATIENT SERVICES | Facility: HOSPITAL | Age: 80
LOS: 1 days | End: 2020-03-02
Payer: MEDICARE

## 2020-03-02 ENCOUNTER — APPOINTMENT (OUTPATIENT)
Dept: NEUROSURGERY | Facility: CLINIC | Age: 80
End: 2020-03-02
Payer: MEDICARE

## 2020-03-02 DIAGNOSIS — M54.16 RADICULOPATHY, LUMBAR REGION: ICD-10-CM

## 2020-03-02 PROCEDURE — 62323 NJX INTERLAMINAR LMBR/SAC: CPT

## 2020-03-13 ENCOUNTER — APPOINTMENT (OUTPATIENT)
Dept: NEUROSURGERY | Facility: CLINIC | Age: 80
End: 2020-03-13

## 2020-03-17 ENCOUNTER — APPOINTMENT (OUTPATIENT)
Dept: NEUROSURGERY | Facility: CLINIC | Age: 80
End: 2020-03-17
Payer: MEDICARE

## 2020-03-17 VITALS
SYSTOLIC BLOOD PRESSURE: 103 MMHG | BODY MASS INDEX: 28.35 KG/M2 | DIASTOLIC BLOOD PRESSURE: 65 MMHG | WEIGHT: 160 LBS | HEART RATE: 92 BPM | HEIGHT: 63 IN

## 2020-03-17 DIAGNOSIS — M51.36 OTHER INTERVERTEBRAL DISC DEGENERATION, LUMBAR REGION: ICD-10-CM

## 2020-03-17 PROCEDURE — 99213 OFFICE O/P EST LOW 20 MIN: CPT

## 2020-03-17 NOTE — REASON FOR VISIT
[Follow-Up: _____] : a [unfilled] follow-up visit [FreeTextEntry1] : Patient returns after her most recent epidural injection.  She states she only feels very slightly improved.  She still has intense pain in her back and more so her right knee.  She is here to discuss her progress.

## 2020-03-17 NOTE — ASSESSMENT
[FreeTextEntry1] : 79 year old female with low back and lumbar radicular pain secondary to stenosis.  As she did not feel any improvement following this treatment, we did discuss alternative measures including pain medication.  We will increase her Gabapentin from 400mg per day to 500mg per day for one week then 600mg per day for one week and eventually 800mg per day.  Side effects were again reveiwed.  We have also discussed restarting Tramadol for her pain.  Side effects were discussed.  She will return to see me in one month to report on her progress but certainly sooner with any questions or concerns.

## 2020-04-10 ENCOUNTER — APPOINTMENT (OUTPATIENT)
Dept: INTERNAL MEDICINE | Facility: CLINIC | Age: 80
End: 2020-04-10
Payer: MEDICARE

## 2020-04-10 VITALS
BODY MASS INDEX: 26.58 KG/M2 | HEIGHT: 63 IN | DIASTOLIC BLOOD PRESSURE: 70 MMHG | WEIGHT: 150 LBS | TEMPERATURE: 98.7 F | SYSTOLIC BLOOD PRESSURE: 106 MMHG | HEART RATE: 76 BPM

## 2020-04-10 PROCEDURE — 99214 OFFICE O/P EST MOD 30 MIN: CPT | Mod: 95

## 2020-04-10 RX ORDER — METHYLPREDNISOLONE 4 MG/1
4 TABLET ORAL
Qty: 2 | Refills: 1 | Status: DISCONTINUED | COMMUNITY
Start: 2019-10-16 | End: 2020-04-10

## 2020-04-10 NOTE — HISTORY OF PRESENT ILLNESS
[FreeTextEntry8] : This is a 79-year-old female who has had chronic back pain. She recently was started on some medications. She thought that the medications were given her symptoms of difficulty walking and dizziness. On our last conversation she said that she had multiple symptoms including diarrhea and stated that she was breathing away she used to\par \par She has no complaints of difficulty breathing. Her nausea has disappeared but she does have a abnormal taste but no lost of smell\par \par I did speak to her significant other who said that she is getting a bit better he was ill about 3 weeks ago but is fine\par \par She is not complaining of her back pain currently but is complaining of a small rash about her face

## 2020-04-10 NOTE — ASSESSMENT
[FreeTextEntry1] : Overall my assessment is that she did indeed have acovid infection but seems to be improving\par \par She looks quite clinically well. She is able to tolerate fluids she has no respiratory complaints at this point\par \par She does have an abnormal taste which seems to imply that this may be what she is suffering from\par \par I continued to emphasize that she should be increasing her fluids as best as she can increase salt in take\par \par I did remind her that she should remain quarantined for at least another 2 weeks she will speak me on a weekly basis

## 2020-07-21 ENCOUNTER — LABORATORY RESULT (OUTPATIENT)
Age: 80
End: 2020-07-21

## 2020-07-21 ENCOUNTER — APPOINTMENT (OUTPATIENT)
Dept: INTERNAL MEDICINE | Facility: CLINIC | Age: 80
End: 2020-07-21
Payer: MEDICARE

## 2020-07-21 VITALS
HEIGHT: 63 IN | WEIGHT: 165 LBS | TEMPERATURE: 98.1 F | DIASTOLIC BLOOD PRESSURE: 80 MMHG | BODY MASS INDEX: 29.23 KG/M2 | SYSTOLIC BLOOD PRESSURE: 130 MMHG

## 2020-07-21 DIAGNOSIS — M19.90 UNSPECIFIED OSTEOARTHRITIS, UNSPECIFIED SITE: ICD-10-CM

## 2020-07-21 PROCEDURE — 36415 COLL VENOUS BLD VENIPUNCTURE: CPT | Mod: CS

## 2020-07-21 PROCEDURE — 99214 OFFICE O/P EST MOD 30 MIN: CPT | Mod: CS,25

## 2020-07-21 RX ORDER — GABAPENTIN 100 MG/1
100 CAPSULE ORAL
Qty: 240 | Refills: 1 | Status: DISCONTINUED | COMMUNITY
Start: 2020-02-25 | End: 2020-07-21

## 2020-07-21 RX ORDER — DICLOFENAC SODIUM 75 MG/1
75 TABLET, DELAYED RELEASE ORAL
Qty: 60 | Refills: 1 | Status: DISCONTINUED | COMMUNITY
Start: 2020-02-25 | End: 2020-07-21

## 2020-07-21 RX ORDER — CYCLOBENZAPRINE HYDROCHLORIDE 10 MG/1
10 TABLET, FILM COATED ORAL EVERY 8 HOURS
Qty: 30 | Refills: 0 | Status: DISCONTINUED | COMMUNITY
Start: 2019-11-15 | End: 2020-07-21

## 2020-07-21 RX ORDER — TRAMADOL HYDROCHLORIDE 50 MG/1
50 TABLET, COATED ORAL TWICE DAILY
Qty: 60 | Refills: 0 | Status: DISCONTINUED | COMMUNITY
Start: 2019-10-11 | End: 2020-07-21

## 2020-07-21 RX ORDER — ZOSTER VACCINE RECOMBINANT, ADJUVANTED 50 MCG/0.5
50 KIT INTRAMUSCULAR
Qty: 2 | Refills: 0 | Status: DISCONTINUED | COMMUNITY
Start: 2018-02-23 | End: 2020-07-21

## 2020-07-21 NOTE — ASSESSMENT
[FreeTextEntry1] : Again this is a complex 79-year-old female with multiple symptoms.\par \par Some of these may be post infectious\par \par In addition some may be depressive\par \par Although I did not find anything on neurologic examination CPK sedimentation rate and anti-acetylcholine receptor binding antibodies were obtained as well as a Lyme's titer\par \par I will try to start her on an antidepressant she will call in 6 weeks\par \par If everything is unchanged we will have her see neurology

## 2020-07-21 NOTE — PHYSICAL EXAM
[Normal Sclera/Conjunctiva] : normal sclera/conjunctiva [No JVD] : no jugular venous distention [Normal] : normal rate, regular rhythm, normal S1 and S2 and no murmur heard [No Edema] : there was no peripheral edema [de-identified] : gait was good [de-identified] : seems to have good muscle strength [de-identified] : 2/6 systolic ejection murmur

## 2020-07-21 NOTE — REVIEW OF SYSTEMS
[Fatigue] : fatigue [Joint Pain] : joint pain [Negative] : Gastrointestinal [de-identified] : unsteady [FreeTextEntry9] : weakness [de-identified] : mild depression

## 2020-07-21 NOTE — HISTORY OF PRESENT ILLNESS
[FreeTextEntry8] : This is a 79-year-old female with multiple and varied complaints. She states that she has weakness but it is not clear whether or not she has weakness or some mild unsteadiness. She has had no falls\par \par She  states that she is depressed.\par \par She continues to have significant joint pain\par \par She also suffers from insomnia and some degree of anxiety\par \par Addition she may have had covid some of these may be residual but they really are very chronic

## 2020-10-22 PROBLEM — Z00.00 MEDICARE ANNUAL WELLNESS VISIT, SUBSEQUENT: Status: ACTIVE | Noted: 2018-02-23

## 2021-03-15 ENCOUNTER — LABORATORY RESULT (OUTPATIENT)
Age: 81
End: 2021-03-15

## 2021-03-15 ENCOUNTER — APPOINTMENT (OUTPATIENT)
Dept: INTERNAL MEDICINE | Facility: CLINIC | Age: 81
End: 2021-03-15
Payer: MEDICARE

## 2021-03-15 ENCOUNTER — NON-APPOINTMENT (OUTPATIENT)
Age: 81
End: 2021-03-15

## 2021-03-15 VITALS
SYSTOLIC BLOOD PRESSURE: 120 MMHG | BODY MASS INDEX: 30.36 KG/M2 | WEIGHT: 165 LBS | DIASTOLIC BLOOD PRESSURE: 80 MMHG | TEMPERATURE: 97.9 F | HEIGHT: 62 IN

## 2021-03-15 DIAGNOSIS — M51.26 OTHER INTERVERTEBRAL DISC DISPLACEMENT, LUMBAR REGION: ICD-10-CM

## 2021-03-15 DIAGNOSIS — Z20.822 CONTACT WITH AND (SUSPECTED) EXPOSURE TO COVID-19: ICD-10-CM

## 2021-03-15 LAB
25(OH)D3 SERPL-MCNC: 37.5 NG/ML
ACRM BINDING ANTIBODY: 0 NMOL/L
ALBUMIN SERPL ELPH-MCNC: 4.6 G/DL
ALP BLD-CCNC: 53 U/L
ALT SERPL-CCNC: 19 U/L
ANION GAP SERPL CALC-SCNC: 15 MMOL/L
AST SERPL-CCNC: 18 U/L
B BURGDOR IGG+IGM SER QL IB: NORMAL
BASOPHILS # BLD AUTO: 0.12 K/UL
BASOPHILS NFR BLD AUTO: 1.7 %
BILIRUB SERPL-MCNC: 0.5 MG/DL
BUN SERPL-MCNC: 15 MG/DL
CALCIUM SERPL-MCNC: 9.6 MG/DL
CHLORIDE SERPL-SCNC: 103 MMOL/L
CHOLEST SERPL-MCNC: 271 MG/DL
CHOLEST/HDLC SERPL: 6.1 RATIO
CK SERPL-CCNC: 88 U/L
CO2 SERPL-SCNC: 22 MMOL/L
CREAT SERPL-MCNC: 0.88 MG/DL
EOSINOPHIL # BLD AUTO: 0.06 K/UL
EOSINOPHIL NFR BLD AUTO: 0.9 %
ERYTHROCYTE [SEDIMENTATION RATE] IN BLOOD BY WESTERGREN METHOD: 31 MM/HR
ESTIMATED AVERAGE GLUCOSE: 123 MG/DL
GLUCOSE SERPL-MCNC: 120 MG/DL
HBA1C MFR BLD HPLC: 5.9 %
HCT VFR BLD CALC: 47.6 %
HDLC SERPL-MCNC: 45 MG/DL
HGB BLD-MCNC: 14.6 G/DL
IMM GRANULOCYTES NFR BLD AUTO: 0.1 %
LDLC SERPL CALC-MCNC: 182 MG/DL
LYMPHOCYTES # BLD AUTO: 1.62 K/UL
LYMPHOCYTES NFR BLD AUTO: 23.4 %
MAN DIFF?: NORMAL
MCHC RBC-ENTMCNC: 28.2 PG
MCHC RBC-ENTMCNC: 30.7 GM/DL
MCV RBC AUTO: 92.1 FL
MONOCYTES # BLD AUTO: 0.56 K/UL
MONOCYTES NFR BLD AUTO: 8.1 %
NEUTROPHILS # BLD AUTO: 4.56 K/UL
NEUTROPHILS NFR BLD AUTO: 65.8 %
PLATELET # BLD AUTO: 258 K/UL
POTASSIUM SERPL-SCNC: 4.5 MMOL/L
PROT SERPL-MCNC: 6.8 G/DL
RBC # BLD: 5.17 M/UL
RBC # FLD: 13.6 %
SODIUM SERPL-SCNC: 140 MMOL/L
T3RU NFR SERPL: 1 TBI
T4 SERPL-MCNC: 7.2 UG/DL
TRIGL SERPL-MCNC: 222 MG/DL
TSH SERPL-ACNC: 2.49 UIU/ML
URATE SERPL-MCNC: 5.9 MG/DL
WBC # FLD AUTO: 6.93 K/UL

## 2021-03-15 PROCEDURE — 93000 ELECTROCARDIOGRAM COMPLETE: CPT | Mod: 59

## 2021-03-15 PROCEDURE — G0439: CPT | Mod: GA

## 2021-03-15 PROCEDURE — 99213 OFFICE O/P EST LOW 20 MIN: CPT | Mod: CS,25

## 2021-03-15 PROCEDURE — 36415 COLL VENOUS BLD VENIPUNCTURE: CPT

## 2021-03-15 RX ORDER — ESCITALOPRAM OXALATE 5 MG/1
5 TABLET ORAL DAILY
Qty: 90 | Refills: 0 | Status: DISCONTINUED | COMMUNITY
Start: 2020-07-21 | End: 2021-03-15

## 2021-03-15 RX ORDER — TRIAMCINOLONE ACETONIDE 1 MG/G
0.1 OINTMENT TOPICAL TWICE DAILY
Qty: 1 | Refills: 0 | Status: DISCONTINUED | COMMUNITY
Start: 2020-04-21 | End: 2021-03-15

## 2021-03-15 RX ORDER — EZETIMIBE 10 MG/1
10 TABLET ORAL
Qty: 90 | Refills: 3 | Status: DISCONTINUED | COMMUNITY
Start: 2019-04-01 | End: 2021-03-15

## 2021-03-15 RX ORDER — MISOPROSTOL 200 UG/1
200 TABLET ORAL
Qty: 120 | Refills: 1 | Status: DISCONTINUED | COMMUNITY
Start: 2020-02-25 | End: 2021-03-15

## 2021-03-15 NOTE — ASSESSMENT
[FreeTextEntry1] : This is a 80-year-old female whose history has been reviewed above\par \par Her primary problem is her back pain which is debilitating she has tried epidurals without success she prescribed gabapentin but is not taking\par \par She is contemplating surgery but I get the feeling not in the close future\par \par She does complain of joint pain I did repeat the serologic work-up she has declined rheumatologic consult\par \par I did feel that she had an aspect of depression she was on an SSRI which she discontinued on her own\par \par She is complaining of nasal discharge she remains on a PPI Zyrtec and apparently is taking Flonase I told her she could see ENT if this was particularly bothersome\par \par She is having difficulty with vision she will have cataract surgery\par \par She is up-to-date with colonoscopy but is declining any further health maintenance.\par \par I will suggest however an echocardiogram since she carries a diagnosis of mitral regurgitation and a bone density\par \par Once again has declined balance therapy but is doing exercises at home\par \par \par \par

## 2021-03-15 NOTE — PHYSICAL EXAM
[No Acute Distress] : no acute distress [Well Nourished] : well nourished [Well Developed] : well developed [Well-Appearing] : well-appearing [Normal Sclera/Conjunctiva] : normal sclera/conjunctiva [PERRL] : pupils equal round and reactive to light [EOMI] : extraocular movements intact [Normal Outer Ear/Nose] : the outer ears and nose were normal in appearance [Normal Oropharynx] : the oropharynx was normal [No JVD] : no jugular venous distention [No Lymphadenopathy] : no lymphadenopathy [Supple] : supple [Thyroid Normal, No Nodules] : the thyroid was normal and there were no nodules present [No Respiratory Distress] : no respiratory distress  [No Accessory Muscle Use] : no accessory muscle use [Clear to Auscultation] : lungs were clear to auscultation bilaterally [Normal Rate] : normal rate  [Regular Rhythm] : with a regular rhythm [Normal S1, S2] : normal S1 and S2 [No Murmur] : no murmur heard [No Carotid Bruits] : no carotid bruits [No Abdominal Bruit] : a ~M bruit was not heard ~T in the abdomen [No Varicosities] : no varicosities [Pedal Pulses Present] : the pedal pulses are present [No Edema] : there was no peripheral edema [No Palpable Aorta] : no palpable aorta [No Extremity Clubbing/Cyanosis] : no extremity clubbing/cyanosis [Soft] : abdomen soft [Non Tender] : non-tender [Non-distended] : non-distended [No Masses] : no abdominal mass palpated [No HSM] : no HSM [Normal Bowel Sounds] : normal bowel sounds [Normal Posterior Cervical Nodes] : no posterior cervical lymphadenopathy [Normal Anterior Cervical Nodes] : no anterior cervical lymphadenopathy [No CVA Tenderness] : no CVA  tenderness [No Spinal Tenderness] : no spinal tenderness [No Joint Swelling] : no joint swelling [Grossly Normal Strength/Tone] : grossly normal strength/tone [No Rash] : no rash [Coordination Grossly Intact] : coordination grossly intact [No Focal Deficits] : no focal deficits [Deep Tendon Reflexes (DTR)] : deep tendon reflexes were 2+ and symmetric [Normal Affect] : the affect was normal [Normal Insight/Judgement] : insight and judgment were intact [de-identified] : 1/6 systolic ejection murmur [de-identified] : PIP and DIP moderately enlarged and she has difficulty closing her hands [de-identified] : Very minimal ataxia

## 2021-03-15 NOTE — REVIEW OF SYSTEMS
[Fatigue] : fatigue [Vision Problems] : vision problems [Joint Pain] : joint pain [Back Pain] : back pain [Negative] : Heme/Lymph [de-identified] : Possibly situational depression

## 2021-03-15 NOTE — HISTORY OF PRESENT ILLNESS
[FreeTextEntry1] : This is an 80-year-old female for annual health assessment\par \par Specifically we will address her history of hypercholesterolemia statin intolerant reflux back pain and she probably did have Covid last year [de-identified] : Patient's major complaints remain her back pain which is lower back pain.  She has joint pain in her hands.  She is having difficulty with vision secondary to cataracts

## 2021-03-15 NOTE — HEALTH RISK ASSESSMENT
[Fair] :  ~his/her~ mood as fair [No] : No [No falls in past year] : Patient reported no falls in the past year [0] : 1) Little interest or pleasure doing things: Not at all (0) [None] : None [With Significant Other] : lives with significant other [Retired] : retired [High School] : high school [Significant Other] : lives with significant other [Feels Safe at Home] : Feels safe at home [Fully functional (bathing, dressing, toileting, transferring, walking, feeding)] : Fully functional (bathing, dressing, toileting, transferring, walking, feeding) [Fully functional (using the telephone, shopping, preparing meals, housekeeping, doing laundry, using] : Fully functional and needs no help or supervision to perform IADLs (using the telephone, shopping, preparing meals, housekeeping, doing laundry, using transportation, managing medications and managing finances) [Reports changes in vision] : Reports changes in vision [Smoke Detector] : smoke detector [Carbon Monoxide Detector] : carbon monoxide detector [Seat Belt] :  uses seat belt [Sunscreen] : uses sunscreen [I will adhere to the patient's wishes as expressed in the advance directive except as noted below.] : I will adhere to the patient's wishes as expressed in the advance directive except as noted below [] : No [Change in mental status noted] : No change in mental status noted [Sexually Active] : not sexually active [Reports changes in hearing] : Reports no changes in hearing [Reports changes in dental health] : Reports no changes in dental health [Guns at Home] : no guns at home [Safety elements used in home] : no safety elements used in home [Travel to Developing Areas] : does not  travel to developing areas [TB Exposure] : is not being exposed to tuberculosis [Caregiver Concerns] : does not have caregiver concerns [FreeTextEntry4] : Significant other is healthcare proxy

## 2021-03-16 LAB
25(OH)D3 SERPL-MCNC: 48.2 NG/ML
ALBUMIN SERPL ELPH-MCNC: 4.2 G/DL
ALP BLD-CCNC: 57 U/L
ALT SERPL-CCNC: 18 U/L
ANION GAP SERPL CALC-SCNC: 9 MMOL/L
AST SERPL-CCNC: 16 U/L
B BURGDOR IGG+IGM SER QL IB: NORMAL
BASOPHILS # BLD AUTO: 0.11 K/UL
BASOPHILS NFR BLD AUTO: 1.3 %
BILIRUB SERPL-MCNC: 0.4 MG/DL
BUN SERPL-MCNC: 15 MG/DL
CALCIUM SERPL-MCNC: 9.5 MG/DL
CHLORIDE SERPL-SCNC: 102 MMOL/L
CHOLEST SERPL-MCNC: 215 MG/DL
CO2 SERPL-SCNC: 26 MMOL/L
CREAT SERPL-MCNC: 0.76 MG/DL
EOSINOPHIL # BLD AUTO: 0.11 K/UL
EOSINOPHIL NFR BLD AUTO: 1.3 %
ESTIMATED AVERAGE GLUCOSE: 120 MG/DL
GLUCOSE SERPL-MCNC: 103 MG/DL
HBA1C MFR BLD HPLC: 5.8 %
HCT VFR BLD CALC: 44.3 %
HCV AB SER QL: NONREACTIVE
HCV S/CO RATIO: 0.09 S/CO
HDLC SERPL-MCNC: 40 MG/DL
HGB BLD-MCNC: 13.6 G/DL
IMM GRANULOCYTES NFR BLD AUTO: 0.2 %
LDLC SERPL CALC-MCNC: 133 MG/DL
LYMPHOCYTES # BLD AUTO: 1.79 K/UL
LYMPHOCYTES NFR BLD AUTO: 21.5 %
MAN DIFF?: NORMAL
MCHC RBC-ENTMCNC: 28.3 PG
MCHC RBC-ENTMCNC: 30.7 GM/DL
MCV RBC AUTO: 92.1 FL
MONOCYTES # BLD AUTO: 0.71 K/UL
MONOCYTES NFR BLD AUTO: 8.5 %
NEUTROPHILS # BLD AUTO: 5.58 K/UL
NEUTROPHILS NFR BLD AUTO: 67.2 %
NONHDLC SERPL-MCNC: 175 MG/DL
PLATELET # BLD AUTO: 289 K/UL
POTASSIUM SERPL-SCNC: 4.8 MMOL/L
PROT SERPL-MCNC: 6.6 G/DL
RBC # BLD: 4.81 M/UL
RBC # FLD: 14.2 %
RHEUMATOID FACT SER QL: <10 IU/ML
SODIUM SERPL-SCNC: 138 MMOL/L
T3RU NFR SERPL: 1 TBI
T4 SERPL-MCNC: 6.8 UG/DL
TRIGL SERPL-MCNC: 210 MG/DL
TSH SERPL-ACNC: 2.06 UIU/ML
URATE SERPL-MCNC: 4.9 MG/DL
VIT B12 SERPL-MCNC: 1945 PG/ML
WBC # FLD AUTO: 8.32 K/UL

## 2021-03-17 LAB
CCP AB SER IA-ACNC: <8 UNITS
ERYTHROCYTE [SEDIMENTATION RATE] IN BLOOD BY WESTERGREN METHOD: 11 MM/HR
RF+CCP IGG SER-IMP: NEGATIVE

## 2021-06-01 ENCOUNTER — RX RENEWAL (OUTPATIENT)
Age: 81
End: 2021-06-01

## 2021-06-02 DIAGNOSIS — N28.1 CYST OF KIDNEY, ACQUIRED: ICD-10-CM

## 2021-06-04 ENCOUNTER — OUTPATIENT (OUTPATIENT)
Dept: OUTPATIENT SERVICES | Facility: HOSPITAL | Age: 81
LOS: 1 days | End: 2021-06-04
Payer: MEDICARE

## 2021-06-04 ENCOUNTER — APPOINTMENT (OUTPATIENT)
Dept: ULTRASOUND IMAGING | Facility: IMAGING CENTER | Age: 81
End: 2021-06-04
Payer: MEDICARE

## 2021-06-04 DIAGNOSIS — N28.1 CYST OF KIDNEY, ACQUIRED: ICD-10-CM

## 2021-06-04 PROCEDURE — 76775 US EXAM ABDO BACK WALL LIM: CPT

## 2021-06-04 PROCEDURE — 76775 US EXAM ABDO BACK WALL LIM: CPT | Mod: 26

## 2021-07-07 ENCOUNTER — NON-APPOINTMENT (OUTPATIENT)
Age: 81
End: 2021-07-07

## 2021-07-07 ENCOUNTER — APPOINTMENT (OUTPATIENT)
Dept: INTERNAL MEDICINE | Facility: CLINIC | Age: 81
End: 2021-07-07
Payer: MEDICARE

## 2021-07-07 VITALS
TEMPERATURE: 98.1 F | BODY MASS INDEX: 32.02 KG/M2 | OXYGEN SATURATION: 97 % | HEIGHT: 62 IN | HEART RATE: 83 BPM | DIASTOLIC BLOOD PRESSURE: 78 MMHG | SYSTOLIC BLOOD PRESSURE: 153 MMHG | WEIGHT: 174 LBS

## 2021-07-07 DIAGNOSIS — H26.9 UNSPECIFIED CATARACT: ICD-10-CM

## 2021-07-07 DIAGNOSIS — Z01.818 ENCOUNTER FOR OTHER PREPROCEDURAL EXAMINATION: ICD-10-CM

## 2021-07-07 PROCEDURE — 93000 ELECTROCARDIOGRAM COMPLETE: CPT

## 2021-07-07 PROCEDURE — 99214 OFFICE O/P EST MOD 30 MIN: CPT | Mod: 25

## 2021-07-07 NOTE — HISTORY OF PRESENT ILLNESS
[No Pertinent Cardiac History] : no history of aortic stenosis, atrial fibrillation, coronary artery disease, recent myocardial infarction, or implantable device/pacemaker [No Pertinent Pulmonary History] : no history of asthma, COPD, sleep apnea, or smoking [No Adverse Anesthesia Reaction] : no adverse anesthesia reaction in self or family member [(Patient denies any chest pain, claudication, dyspnea on exertion, orthopnea, palpitations or syncope)] : Patient denies any chest pain, claudication, dyspnea on exertion, orthopnea, palpitations or syncope [Moderate (4-6 METs)] : Moderate (4-6 METs) [Chronic Anticoagulation] : no chronic anticoagulation [Chronic Kidney Disease] : no chronic kidney disease [Diabetes] : no diabetes [FreeTextEntry1] : Right eye cataract extraction [FreeTextEntry2] : 7/14/2021 [FreeTextEntry3] : Dr. Brooks [FreeTextEntry4] : 80 year old female presents for pre-operative clearance. \par \par no cp, soboe, palpitations or lightheadedness.  \par \par compliant with her meds, no SE\par  [FreeTextEntry7] : ecg-nsr.

## 2021-07-07 NOTE — ASSESSMENT
[Patient Optimized for Surgery] : Patient optimized for surgery [No Further Testing Recommended] : no further testing recommended [As per surgery] : as per surgery [FreeTextEntry4] : 80 year old female presents for pre-operative clearance\par \par Patient is moderate candidate undergoing a low risk procedure.\par No absolute contraindication, therefore medically optimized and cleared for procedure

## 2021-07-07 NOTE — PHYSICAL EXAM
[Normal] : normal rate, regular rhythm, normal S1 and S2 and no murmur heard [No Varicosities] : no varicosities [Pedal Pulses Present] : the pedal pulses are present [No Edema] : there was no peripheral edema [No Extremity Clubbing/Cyanosis] : no extremity clubbing/cyanosis [Soft] : abdomen soft [Non Tender] : non-tender [Non-distended] : non-distended [Normal Bowel Sounds] : normal bowel sounds [No Focal Deficits] : no focal deficits [Normal Gait] : normal gait

## 2021-10-08 ENCOUNTER — NON-APPOINTMENT (OUTPATIENT)
Age: 81
End: 2021-10-08

## 2021-10-11 ENCOUNTER — NON-APPOINTMENT (OUTPATIENT)
Age: 81
End: 2021-10-11

## 2021-10-25 ENCOUNTER — APPOINTMENT (OUTPATIENT)
Dept: INTERNAL MEDICINE | Facility: CLINIC | Age: 81
End: 2021-10-25
Payer: MEDICARE

## 2021-10-25 VITALS — SYSTOLIC BLOOD PRESSURE: 120 MMHG | HEIGHT: 63 IN | DIASTOLIC BLOOD PRESSURE: 70 MMHG

## 2021-10-25 DIAGNOSIS — I73.9 PERIPHERAL VASCULAR DISEASE, UNSPECIFIED: ICD-10-CM

## 2021-10-25 DIAGNOSIS — R33.9 RETENTION OF URINE, UNSPECIFIED: ICD-10-CM

## 2021-10-25 PROCEDURE — 99214 OFFICE O/P EST MOD 30 MIN: CPT

## 2021-10-25 NOTE — PHYSICAL EXAM
[No Respiratory Distress] : no respiratory distress  [Normal Rate] : normal rate  [No CVA Tenderness] : no CVA  tenderness [No Focal Deficits] : no focal deficits [Normal] : affect was normal and insight and judgment were intact [de-identified] : Mild cushingoid [de-identified] : Continued back pain

## 2021-10-25 NOTE — HISTORY OF PRESENT ILLNESS
[FreeTextEntry1] : This is an 80-year-old female who is status post lumbar spinal surgery. [de-identified] : States that she had a difficult time in the OR that she may have become aggressive.  She wind up with 2 black eyes and abrasions on her arm.  In addition she was noted to be in urinary retention.\par \par Currently her pain is still quite significant and she is on a Medrol pack

## 2021-10-25 NOTE — REVIEW OF SYSTEMS
[Fatigue] : fatigue [Back Pain] : back pain [Negative] : Psychiatric [FreeTextEntry8] : Urinary retention

## 2021-10-25 NOTE — ASSESSMENT
[FreeTextEntry1] : This is an 80-year-old female who had lumbar surgery.  She had at least 2 discs done I am waiting for the operative report\par \par She had a difficult time during surgery and apparently had a aggressive reaction had to be restrained and suffered to block eyes and abrasions to the left arm she has recovered\par \par She also was noted to be in urinary retention she has followed up with urology\par \par Currently her pain level is diminished but still intense and she is in the midst of a steroid pack\par \par She is normotensive and physical examination is unchanged.  I have deferred blood tests for numerous reasons particularly because she is on steroids but I will follow up in a relatively quick fashion\par \par I did outline inoculations including Covid booster followed by a flu shot followed by a repeat Pneumovax\par \par She will call if there are any difficulties and she will come in\par \par In addition I did ask her to supply all consultations and operative reports

## 2022-01-07 NOTE — ED PROCEDURE NOTE - ATTENDING CONTRIBUTION TO CARE
Yes
Attending MD Coulter: I was present during the key portions of the procedure and immediately available all other times.

## 2022-03-14 ENCOUNTER — EMERGENCY (EMERGENCY)
Facility: HOSPITAL | Age: 82
LOS: 1 days | Discharge: LEFT BEFORE DISCHARGE | End: 2022-03-14
Attending: EMERGENCY MEDICINE
Payer: MEDICARE

## 2022-03-14 VITALS
HEIGHT: 63 IN | HEART RATE: 76 BPM | TEMPERATURE: 98 F | DIASTOLIC BLOOD PRESSURE: 77 MMHG | WEIGHT: 169.98 LBS | SYSTOLIC BLOOD PRESSURE: 132 MMHG | RESPIRATION RATE: 18 BRPM | OXYGEN SATURATION: 97 %

## 2022-03-14 LAB
ALBUMIN SERPL ELPH-MCNC: 4.3 G/DL — SIGNIFICANT CHANGE UP (ref 3.3–5)
ALP SERPL-CCNC: 52 U/L — SIGNIFICANT CHANGE UP (ref 40–120)
ALT FLD-CCNC: 22 U/L — SIGNIFICANT CHANGE UP (ref 10–45)
ANION GAP SERPL CALC-SCNC: 13 MMOL/L — SIGNIFICANT CHANGE UP (ref 5–17)
APTT BLD: 28.2 SEC — SIGNIFICANT CHANGE UP (ref 27.5–35.5)
AST SERPL-CCNC: 21 U/L — SIGNIFICANT CHANGE UP (ref 10–40)
BASOPHILS # BLD AUTO: 0.08 K/UL — SIGNIFICANT CHANGE UP (ref 0–0.2)
BASOPHILS NFR BLD AUTO: 1 % — SIGNIFICANT CHANGE UP (ref 0–2)
BILIRUB SERPL-MCNC: 0.4 MG/DL — SIGNIFICANT CHANGE UP (ref 0.2–1.2)
BUN SERPL-MCNC: 14 MG/DL — SIGNIFICANT CHANGE UP (ref 7–23)
CALCIUM SERPL-MCNC: 9.2 MG/DL — SIGNIFICANT CHANGE UP (ref 8.4–10.5)
CHLORIDE SERPL-SCNC: 102 MMOL/L — SIGNIFICANT CHANGE UP (ref 96–108)
CO2 SERPL-SCNC: 23 MMOL/L — SIGNIFICANT CHANGE UP (ref 22–31)
CREAT SERPL-MCNC: 0.79 MG/DL — SIGNIFICANT CHANGE UP (ref 0.5–1.3)
EGFR: 75 ML/MIN/1.73M2 — SIGNIFICANT CHANGE UP
EOSINOPHIL # BLD AUTO: 0.08 K/UL — SIGNIFICANT CHANGE UP (ref 0–0.5)
EOSINOPHIL NFR BLD AUTO: 1 % — SIGNIFICANT CHANGE UP (ref 0–6)
GLUCOSE SERPL-MCNC: 122 MG/DL — HIGH (ref 70–99)
HCT VFR BLD CALC: 42.2 % — SIGNIFICANT CHANGE UP (ref 34.5–45)
HGB BLD-MCNC: 13 G/DL — SIGNIFICANT CHANGE UP (ref 11.5–15.5)
IMM GRANULOCYTES NFR BLD AUTO: 0.2 % — SIGNIFICANT CHANGE UP (ref 0–1.5)
INR BLD: 0.99 RATIO — SIGNIFICANT CHANGE UP (ref 0.88–1.16)
LYMPHOCYTES # BLD AUTO: 1.7 K/UL — SIGNIFICANT CHANGE UP (ref 1–3.3)
LYMPHOCYTES # BLD AUTO: 20.2 % — SIGNIFICANT CHANGE UP (ref 13–44)
MCHC RBC-ENTMCNC: 26.6 PG — LOW (ref 27–34)
MCHC RBC-ENTMCNC: 30.8 GM/DL — LOW (ref 32–36)
MCV RBC AUTO: 86.5 FL — SIGNIFICANT CHANGE UP (ref 80–100)
MONOCYTES # BLD AUTO: 0.56 K/UL — SIGNIFICANT CHANGE UP (ref 0–0.9)
MONOCYTES NFR BLD AUTO: 6.7 % — SIGNIFICANT CHANGE UP (ref 2–14)
NEUTROPHILS # BLD AUTO: 5.96 K/UL — SIGNIFICANT CHANGE UP (ref 1.8–7.4)
NEUTROPHILS NFR BLD AUTO: 70.9 % — SIGNIFICANT CHANGE UP (ref 43–77)
NRBC # BLD: 0 /100 WBCS — SIGNIFICANT CHANGE UP (ref 0–0)
PLATELET # BLD AUTO: 244 K/UL — SIGNIFICANT CHANGE UP (ref 150–400)
POTASSIUM SERPL-MCNC: 3.9 MMOL/L — SIGNIFICANT CHANGE UP (ref 3.5–5.3)
POTASSIUM SERPL-SCNC: 3.9 MMOL/L — SIGNIFICANT CHANGE UP (ref 3.5–5.3)
PROT SERPL-MCNC: 6.9 G/DL — SIGNIFICANT CHANGE UP (ref 6–8.3)
PROTHROM AB SERPL-ACNC: 11.4 SEC — SIGNIFICANT CHANGE UP (ref 10.5–13.4)
RBC # BLD: 4.88 M/UL — SIGNIFICANT CHANGE UP (ref 3.8–5.2)
RBC # FLD: 16.3 % — HIGH (ref 10.3–14.5)
SODIUM SERPL-SCNC: 138 MMOL/L — SIGNIFICANT CHANGE UP (ref 135–145)
WBC # BLD: 8.4 K/UL — SIGNIFICANT CHANGE UP (ref 3.8–10.5)
WBC # FLD AUTO: 8.4 K/UL — SIGNIFICANT CHANGE UP (ref 3.8–10.5)

## 2022-03-14 PROCEDURE — 99284 EMERGENCY DEPT VISIT MOD MDM: CPT | Mod: GC

## 2022-03-14 PROCEDURE — 80053 COMPREHEN METABOLIC PANEL: CPT

## 2022-03-14 PROCEDURE — 86901 BLOOD TYPING SEROLOGIC RH(D): CPT

## 2022-03-14 PROCEDURE — 99284 EMERGENCY DEPT VISIT MOD MDM: CPT | Mod: 25

## 2022-03-14 PROCEDURE — 85025 COMPLETE CBC W/AUTO DIFF WBC: CPT

## 2022-03-14 PROCEDURE — 85730 THROMBOPLASTIN TIME PARTIAL: CPT

## 2022-03-14 PROCEDURE — 72131 CT LUMBAR SPINE W/O DYE: CPT | Mod: 26,MA

## 2022-03-14 PROCEDURE — 72131 CT LUMBAR SPINE W/O DYE: CPT | Mod: MA

## 2022-03-14 PROCEDURE — 86900 BLOOD TYPING SEROLOGIC ABO: CPT

## 2022-03-14 PROCEDURE — 85610 PROTHROMBIN TIME: CPT

## 2022-03-14 PROCEDURE — 86850 RBC ANTIBODY SCREEN: CPT

## 2022-03-14 NOTE — ED PROVIDER NOTE - ATTENDING CONTRIBUTION TO CARE
MD Leon:  patient seen and evaluated personally.   I agree with the History & Physical,  Impression & Plan other than what was detailed in my note.  MD Leon  81y F w/ PSHx of lower back surgeries, laminectomy (Oct 2021) presents to the ED c/o worsening B/L feet numbness, worse in toes, also having mild numbness in hands (denies hx of dm, vit deficiency, has normal diet, takes vitamin pills), having increased weakness (legs gave out causing fall where she hit head and had stable placed cdi staples intact, healing well), pos chronic incont, no worse, having worsened back pain, no f/c vomting diarrhea, afebrile vitals stable  non toxic well appearing, NC/AT,  conjunctiva non conjected, sclera anicteric, moist mucous membranes, neck supple, heart sounds, normal, no mrg, lungs cta b/l no wrr, abd soft non distended w/ no tenderness, no visual deformities of extremities, axox3, power 5/5x 4, cn 2-xii gi, concern given worsened back pain, numbness, placed for non con ct l spine but will get w/ iv to evaluate for hematoma less likely epidural abscess. will discuss w/ spine as well.

## 2022-03-14 NOTE — ED PROVIDER NOTE - PHYSICAL EXAMINATION
Physical Exam:  Gen: NAD, non-toxic appearing, awake alert   Head: staples in place  HEENT: EOMI, PEERL, normal conjunctiva, oral mucosa moist  Lung: CTAB, no respiratory distress, no wheezes/rhonchi/rales B/L, speaking in full sentences  CV: RRR  Abd: soft, NT, ND, no guarding, no rigidity, no rebound tenderness, no CVA tenderness   MSK: no visible deformities, ROM normal in UE/LE, no spinal tenderness   Neuro: endorsing numbness to b/l feet although equal, No focal motor deficits  Skin: Warm, well perfused, no rash, no leg swelling  ~Vimal Perez MD (PGY-2)

## 2022-03-14 NOTE — ED PROVIDER NOTE - RAPID ASSESSMENT
81y F w/ PSHx of lower back surgeries, laminectomy (Oct 2021) presents to the ED c/o worsening B/L feet numbness xfewweeks. Used to be relieved w/ heating pad. Recently was not relieved w/ heating pad so decided to come to the ED. Pt also fall yesterday backwards and +head trauma into a safe. Pt states she was sitting on the ground and tried to stand when her leg gave out. Also endorses chronic urinary incontinence. Got CT head and neck done at outside hospital. Denies fever.    IJeffry (Scribe) have documented this rapid assessment note under the dictation of Aure Bell (PA) which has been reviewed and affirmed to be accurate. Patient was seen as a QPA patient. The patient will be seen and further worked up in the main emergency department and their care will be completed by the main emergency department team along with a thorough physical exam. Receiving team will follow up on labs, analgesia, any clinical imaging, reassess and disposition as clinically indicated, all decisions regarding the progression of care will be made at their discretion. 81y F w/ PSHx of lower back surgeries, laminectomy (Oct 2021) presents to the ED c/o worsening B/L feet numbness xfewweeks. Used to be relieved w/ heating pad. Recently was not relieved w/ heating pad so decided to come to the ED. Pt also fall yesterday backwards and +head trauma into a safe. back pain in low back somewhat worse since the fall. Pt states she was sitting on the ground and tried to stand when her leg gave out. Also endorses chronic urinary incontinence. Got CT head and neck done at outside hospital. Denies fever. also notes that she had 2 epidurals in the last 2 months and MRI in dec.    I, Jeffry Vázquez (Scribe) have documented this rapid assessment note under the dictation of Aure Bell (PA) which has been reviewed and affirmed to be accurate. Patient was seen as a QPA patient. The patient will be seen and further worked up in the main emergency department and their care will be completed by the main emergency department team along with a thorough physical exam. Receiving team will follow up on labs, analgesia, any clinical imaging, reassess and disposition as clinically indicated, all decisions regarding the progression of care will be made at their discretion.    Aure Bell PA-C: The scribe's documentation has been prepared under my direction and personally reviewed by me in its entirety. I confirm that the note above accurately reflects history obtained.   Patient was rapidly assessed via telemedicine encounter; a limited history was obtained. The patient will be seen and further examined and worked up in the main ED and their care will be completed by the main ED team. Receiving team will follow up on labs, analgesia, any clinical imaging, and perform reassessment and disposition of the patient as clinically indicated. All decisions regarding the progression of care will be made at their discretion. 81y F w/ PSHx of lower back surgeries, laminectomy (Oct 2021) presents to the ED c/o worsening B/L feet numbness xfewweeks. Used to be relieved w/ heating pad. Recently was not relieved w/ heating pad so decided to come to the ED. Pt also fall yesterday backwards and +head trauma into a safe. back pain in low back somewhat worse since the fall. Pt states she was sitting on the ground and tried to stand when her leg gave out. Also endorses chronic urinary incontinence. Got CT head and neck done at outside hospital. Denies fever. also notes that she had 2 epidurals in the last 2 months and MRI in dec.    I, Jeffry Vázquez (Scribe) have documented this rapid assessment note under the dictation of Aure Bell (PA) which has been reviewed and affirmed to be accurate. Patient was seen as a QPA patient. The patient will be seen and further worked up in the main emergency department and their care will be completed by the main emergency department team along with a thorough physical exam. Receiving team will follow up on labs, analgesia, any clinical imaging, reassess and disposition as clinically indicated, all decisions regarding the progression of care will be made at their discretion.    Aure Bell PA-C: The scribe's documentation has been prepared under my direction and personally reviewed by me in its entirety. I confirm that the note above accurately reflects history obtained.   Patient was rapidly assessed via telemedicine encounter; a limited history was obtained. The patient will be seen and further examined and worked up in the main ED and their care will be completed by the main ED team. Receiving team will follow up on labs, analgesia, any clinical imaging, and perform reassessment and disposition of the patient as clinically indicated. All decisions regarding the progression of care will be made at their discretion.    Attending MD Bermudez: This patient was seen and orders were placed by the PA as per our department's QPA model.  I was not consulted in regards to this patient although I was present and available in the Emergency Department to the PA.  Patient was to be sent to main ED for full medical evaluation and receiving team was to follow up on any labs, analgesia, clinical imaging ordered by the PA.  Any reassessment and disposition decisions were to be made by receiving team as clinically indicated, all decisions regarding the progression of care to be made at their discretion.  I did not perform a comprehensive history and physical on this patient.

## 2022-03-14 NOTE — ED PROVIDER NOTE - NS ED ROS FT
CONST: no fevers, no chills  EYES: no vision changes  ENT: no sore throat  CV: no chest pain, no leg swelling  RESP: no shortness of breath, no cough  ABD: no abdominal pain, no nausea, no vomiting, no diarrhea  : no dysuria, no flank pain, no hematuria  MSK: no back pain, no extremity pain  NEURO: +feet numbness  SKIN:  no rash

## 2022-03-14 NOTE — ED ADULT TRIAGE NOTE - CHIEF COMPLAINT QUOTE
b/l leg/foot numbness worsening (left worse) hx back surgeries  fell yesterday seen at [facility] sutures to head  sent by pmd

## 2022-03-14 NOTE — ED PROVIDER NOTE - PATIENT PORTAL LINK FT
You can access the FollowMyHealth Patient Portal offered by [facility] by registering at the following website: [email or web address]. By joining [facility]’s FollowMyHealth portal, you will also be able to view your health information using other applications (apps) compatible with our system.

## 2022-03-14 NOTE — ED PROVIDER NOTE - OBJECTIVE STATEMENT
81F PMH HLD, chronic low back pain s/p lumbar laminectomy 10/2021 (Dr. Vicente) and 2 epidurals on 1/19 and 2/25 (Dr. Chyna Chacon) p/w b/l foot numbness worse yest causing her legs to give out. Had head trauma w/o LOC; was seen at [facility] where CT head/c-spine were negative and staples were placed. No f/c, chest pain, SOB, vision changes, abd pain, weakness. Pt has chronic urinary incontinence. Numbness was worse yest than today

## 2022-03-14 NOTE — ED PROVIDER NOTE - PROGRESS NOTE DETAILS
Chris OSEGUERA (PGY-2)  pt seen by [facility]. recommending mri in the [facility]  explained to pt and daughter; pt would like to leave [facility]  The patient is A&Ox3 and leaving against medical advice. I have spoken with the patient and discussed the results and plans, but the patient continue to conveys unwillingness to stay and be treated. The patient understands the risk of leaving without further evaluation and workup. The risks includes possible worsening of symptoms, chronic disabilities, and possible death. The patient is told and understands that he/she is welcomed to come back to the ER at anytime for worsening symptoms.

## 2022-03-14 NOTE — ED PROVIDER NOTE - CLINICAL SUMMARY MEDICAL DECISION MAKING FREE TEXT BOX
81F p/w b/l foot numbness in setting of laminectomy and epidurals. VSS in ED. Lungs ctab, abd NT, subjective numbness to b/l feet  Will eval for spinal hematoma vs abscess vs cord compression  Plan: pre op labs, CT lumbosacral spine with contrast, MRI spine with contrast, reassess symptoms

## 2022-03-14 NOTE — ED PROVIDER NOTE - NSFOLLOWUPINSTRUCTIONS_ED_ALL_ED_FT
Follow up with your Neurosurgeon within the next 2-3 days for an MRI    You may return to the Emergency Department at any time for further evaluation    Return to the Emergency Department if you have any new or worsening symptoms, including but not limited to persistent fevers, worsening numbness, weakness, or inability to walk

## 2022-03-15 DIAGNOSIS — Z98.890 OTHER SPECIFIED POSTPROCEDURAL STATES: Chronic | ICD-10-CM

## 2022-03-15 LAB
BLD GP AB SCN SERPL QL: NEGATIVE — SIGNIFICANT CHANGE UP
RH IG SCN BLD-IMP: POSITIVE — SIGNIFICANT CHANGE UP

## 2022-03-15 NOTE — ED ADULT NURSE NOTE - OBJECTIVE STATEMENT
81 year old female coming in for weakness. PMH of lower back surgeries, laminectomy. Patient is coming in today complaining of feet numbness for a few weeks which has gotten progressively worse. Pt also had a fall yesterday. Patient felt her legs were week when trying to stand up from the floor, so she fell backwards and hit her head on an object. As per pt no LOC. No obvious deformities and bruising of the head. Patient is complaining of pain in low back somewhat worse since the fall.  Got CT head and neck done at outside hospital unsure of results. Currently has full sensation intact.  No headache or dizziness.

## 2022-03-15 NOTE — ED ADULT NURSE NOTE - CHIEF COMPLAINT QUOTE
b/l leg/foot numbness worsening (left worse) hx back surgeries  fell yesterday seen at [facility] sutures to head  sent by pmd

## 2022-03-15 NOTE — CONSULT NOTE ADULT - SUBJECTIVE AND OBJECTIVE BOX
p ([id number])     HPI:  81F hx L2-4 Lami in october at [facility] p/w bilateral toe numbness. Says it was worse after fall yesterday but is now improved. Also reports issues of chronic urinary retention and chronic RLE radic that did not improve postop. Exam: AAOx3, LAWRENCE 5/5, walking. Denies fevers.    --Anticoagulation:    =====================  PAST MEDICAL HISTORY     PAST SURGICAL HISTORY     penicillin (Rash)  sulfa drugs (Rash)      MEDICATIONS:  Antibiotics:    Neuro:    Other:      SOCIAL HISTORY:   Occupation:   Marital Status:     FAMILY HISTORY:      ROS: Negative except per HPI    LABS:  PT/INR - ( 14 Mar 2022 21:08 )   PT: 11.4 sec;   INR: 0.99 ratio         PTT - ( 14 Mar 2022 21:08 )  PTT:28.2 sec                        13.0   8.40  )-----------( 244      ( 14 Mar 2022 21:08 )             42.2     03-14    138  |  102  |  14  ----------------------------<  122<H>  3.9   |  23  |  0.79    Ca    9.2      14 Mar 2022 21:08    TPro  6.9  /  Alb  4.3  /  TBili  0.4  /  DBili  x   /  AST  21  /  ALT  22  /  AlkPhos  52  03-14

## 2022-03-15 NOTE — CONSULT NOTE ADULT - ASSESSMENT
Jayde Crooks    81F hx L2-4 Lami in october at [facility] p/w bilateral toe numbness. Says it was worse after fall yesterday but is now improved. Also reports issues of chronic urinary retention and chronic RLE radic that did not improve postop. Exam: AAOx3, LAWRENCE 5/5, walking. Denies fevers.  -Recommend MRI w/wo and ESR to r/o infection  -Patient states she would not want an additional surgery  -Patient signed out AMA, can followup outpatient with her surgeon at [facility] as needed or return to eR with any worsening symptoms

## 2022-08-02 ENCOUNTER — RX RENEWAL (OUTPATIENT)
Age: 82
End: 2022-08-02

## 2022-09-23 PROBLEM — E78.5 HYPERLIPIDEMIA, UNSPECIFIED: Chronic | Status: ACTIVE | Noted: 2022-03-15

## 2022-10-25 ENCOUNTER — NON-APPOINTMENT (OUTPATIENT)
Age: 82
End: 2022-10-25

## 2022-10-25 ENCOUNTER — LABORATORY RESULT (OUTPATIENT)
Age: 82
End: 2022-10-25

## 2022-10-25 ENCOUNTER — APPOINTMENT (OUTPATIENT)
Dept: INTERNAL MEDICINE | Facility: CLINIC | Age: 82
End: 2022-10-25

## 2022-10-25 VITALS
DIASTOLIC BLOOD PRESSURE: 82 MMHG | SYSTOLIC BLOOD PRESSURE: 120 MMHG | BODY MASS INDEX: 31.18 KG/M2 | HEIGHT: 63 IN | WEIGHT: 176 LBS

## 2022-10-25 DIAGNOSIS — Z23 ENCOUNTER FOR IMMUNIZATION: ICD-10-CM

## 2022-10-25 DIAGNOSIS — R42 DIZZINESS AND GIDDINESS: ICD-10-CM
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
## 2022-10-25 LAB — NONINV COLON CA DNA+OCC BLD SCRN STL QL: POSITIVE

## 2022-10-25 PROCEDURE — 99214 OFFICE O/P EST MOD 30 MIN: CPT | Mod: 25

## 2022-10-25 PROCEDURE — G0439: CPT

## 2022-10-25 PROCEDURE — 93000 ELECTROCARDIOGRAM COMPLETE: CPT | Mod: 59

## 2022-10-25 PROCEDURE — 36415 COLL VENOUS BLD VENIPUNCTURE: CPT

## 2022-10-25 PROCEDURE — G0009: CPT

## 2022-10-25 PROCEDURE — 90677 PCV20 VACCINE IM: CPT

## 2022-10-25 RX ORDER — METHYLPREDNISOLONE 4 MG/1
4 TABLET ORAL
Qty: 1 | Refills: 0 | Status: DISCONTINUED | COMMUNITY
Start: 2021-08-30 | End: 2022-10-25

## 2022-10-25 RX ORDER — TRAMADOL HYDROCHLORIDE 50 MG/1
50 TABLET, COATED ORAL
Qty: 21 | Refills: 0 | Status: DISCONTINUED | COMMUNITY
Start: 2021-09-15 | End: 2022-10-25

## 2022-10-25 NOTE — HEALTH RISK ASSESSMENT
[Fair] :  ~his/her~ mood as fair [Never] : Never [No] : No [No falls in past year] : Patient reported no falls in the past year [0] : 1) Little interest or pleasure doing things: Not at all (0) [PHQ-2 Negative - No further assessment needed] : PHQ-2 Negative - No further assessment needed [None] : None [With Significant Other] : lives with significant other [Significant Other] : lives with significant other [Feels Safe at Home] : Feels safe at home [Fully functional (bathing, dressing, toileting, transferring, walking, feeding)] : Fully functional (bathing, dressing, toileting, transferring, walking, feeding) [Fully functional (using the telephone, shopping, preparing meals, housekeeping, doing laundry, using] : Fully functional and needs no help or supervision to perform IADLs (using the telephone, shopping, preparing meals, housekeeping, doing laundry, using transportation, managing medications and managing finances) [Smoke Detector] : smoke detector [Carbon Monoxide Detector] : carbon monoxide detector [Seat Belt] :  uses seat belt [Sunscreen] : uses sunscreen [I will adhere to the patient's wishes.] : I will adhere to the patient's wishes. [Change in mental status noted] : No change in mental status noted [Sexually Active] : not sexually active [Reports changes in hearing] : Reports no changes in hearing [Reports changes in dental health] : Reports no changes in dental health [Guns at Home] : no guns at home [Safety elements used in home] : no safety elements used in home [Travel to Developing Areas] : does not  travel to developing areas [TB Exposure] : is not being exposed to tuberculosis [Caregiver Concerns] : does not have caregiver concerns [de-identified] : Improved with cataracts

## 2022-10-25 NOTE — REVIEW OF SYSTEMS
[Fatigue] : fatigue [Back Pain] : back pain [Memory Loss] : memory loss [Negative] : Heme/Lymph [de-identified] : Patient had a fall that she does not remember

## 2022-10-25 NOTE — PHYSICAL EXAM
[No Acute Distress] : no acute distress [Well Nourished] : well nourished [Well Developed] : well developed [Well-Appearing] : well-appearing [Normal Sclera/Conjunctiva] : normal sclera/conjunctiva [PERRL] : pupils equal round and reactive to light [EOMI] : extraocular movements intact [Normal Outer Ear/Nose] : the outer ears and nose were normal in appearance [Normal Oropharynx] : the oropharynx was normal [No JVD] : no jugular venous distention [No Lymphadenopathy] : no lymphadenopathy [Supple] : supple [Thyroid Normal, No Nodules] : the thyroid was normal and there were no nodules present [No Respiratory Distress] : no respiratory distress  [No Accessory Muscle Use] : no accessory muscle use [Clear to Auscultation] : lungs were clear to auscultation bilaterally [Normal Rate] : normal rate  [Regular Rhythm] : with a regular rhythm [Normal S1, S2] : normal S1 and S2 [No Murmur] : no murmur heard [No Carotid Bruits] : no carotid bruits [No Abdominal Bruit] : a ~M bruit was not heard ~T in the abdomen [No Varicosities] : no varicosities [Pedal Pulses Present] : the pedal pulses are present [No Edema] : there was no peripheral edema [No Palpable Aorta] : no palpable aorta [No Extremity Clubbing/Cyanosis] : no extremity clubbing/cyanosis [Soft] : abdomen soft [Non Tender] : non-tender [Non-distended] : non-distended [No HSM] : no HSM [Normal Bowel Sounds] : normal bowel sounds [Normal Posterior Cervical Nodes] : no posterior cervical lymphadenopathy [Normal Anterior Cervical Nodes] : no anterior cervical lymphadenopathy [No CVA Tenderness] : no CVA  tenderness [No Spinal Tenderness] : no spinal tenderness [No Joint Swelling] : no joint swelling [Grossly Normal Strength/Tone] : grossly normal strength/tone [No Rash] : no rash [Coordination Grossly Intact] : coordination grossly intact [No Focal Deficits] : no focal deficits [Normal Gait] : normal gait [Normal Affect] : the affect was normal [Deep Tendon Reflexes (DTR)] : deep tendon reflexes were 2+ and symmetric [Normal Insight/Judgement] : insight and judgment were intact [Normal Appearance] : normal in appearance [No Masses] : no palpable masses [No Nipple Discharge] : no nipple discharge [No Axillary Lymphadenopathy] : no axillary lymphadenopathy [de-identified] : Overweight [de-identified] : 2/6 systolic ejection murmur

## 2022-10-25 NOTE — HISTORY OF PRESENT ILLNESS
[FreeTextEntry1] : This is an 81-year-old female for annual health assessment.  Specifically we will address her history of hypercholesterolemia statins intolerance back pain and reflux [de-identified] : Patient's major complaint has been and continues to be her back.  This is low back pain and some radicular component on the right.  This despite surgery\par \par She also has had a couple of episodes of spontaneous falls she states that a couple of months ago she had a fall down the stairs which she does not recall there was no neurologic or cardiac evaluation at that time\par \par She does have insomnia but she has cured this with marijuana\par \par She has gained a substantial amount of weight\par \par She also states that she has difficulty with memory

## 2022-10-25 NOTE — ASSESSMENT
[FreeTextEntry1] : This is a 81-year-old female whose history has been reviewed above\par \par Patient's primary problem at this time is her back pain.  This is despite surgery.  I we will offer her physical therapy although she does a significant amount at home\par \par She is no longer complaining of joint pain we did a serologic work-up in the past which was negative\par \par She is taking considerable amount of marijuana.  She has gained a substantial amount of weight we did talk about the deleterious effects of weight on multiple issues.  However this has made her life easier and has allowed her to sleep\par \par She has improved vision after cataract surgery\par \par She thought she was having some difficulty hearing but had her hearing tested and it was negative\par \par She did do a Cologuard we just got the results which were positive I will ask her to go for colonoscopy\par \par I am concerned about the fall that she experienced with no recollection.  This may be retrograde amnesia however I will ask her to go for a echo and a Holter monitor.  Also neurologic examination\par \par She is having a peculiar feeling in her throat when she lies down this is probably postnasal I will ask her to start Flonase\par \par I will write out suggestions stress echo Holter neurologic evaluation and colonoscopy she will be given a Prevnar.  The orders have been put in for her cardiac testing and follow-up with neuro

## 2022-10-26 ENCOUNTER — TRANSCRIPTION ENCOUNTER (OUTPATIENT)
Age: 82
End: 2022-10-26

## 2022-10-26 DIAGNOSIS — R51.9 HEADACHE, UNSPECIFIED: ICD-10-CM

## 2022-10-26 LAB
25(OH)D3 SERPL-MCNC: 73.2 NG/ML
ALBUMIN SERPL ELPH-MCNC: 4.5 G/DL
ALP BLD-CCNC: 62 U/L
ALT SERPL-CCNC: 21 U/L
ANION GAP SERPL CALC-SCNC: 13 MMOL/L
APPEARANCE: CLEAR
AST SERPL-CCNC: 14 U/L
BASOPHILS # BLD AUTO: 0.14 K/UL
BASOPHILS NFR BLD AUTO: 1.6 %
BILIRUB SERPL-MCNC: 0.3 MG/DL
BILIRUBIN URINE: NEGATIVE
BLOOD URINE: NEGATIVE
BUN SERPL-MCNC: 13 MG/DL
CALCIUM SERPL-MCNC: 10.2 MG/DL
CHLORIDE SERPL-SCNC: 101 MMOL/L
CHOLEST SERPL-MCNC: 251 MG/DL
CO2 SERPL-SCNC: 26 MMOL/L
COLOR: NORMAL
CREAT SERPL-MCNC: 0.81 MG/DL
EGFR: 73 ML/MIN/1.73M2
EOSINOPHIL # BLD AUTO: 0.08 K/UL
EOSINOPHIL NFR BLD AUTO: 0.9 %
ESTIMATED AVERAGE GLUCOSE: 120 MG/DL
GLUCOSE QUALITATIVE U: NEGATIVE
GLUCOSE SERPL-MCNC: 115 MG/DL
HBA1C MFR BLD HPLC: 5.8 %
HCT VFR BLD CALC: 41.9 %
HDLC SERPL-MCNC: 41 MG/DL
HGB BLD-MCNC: 13.4 G/DL
IMM GRANULOCYTES NFR BLD AUTO: 0.5 %
KETONES URINE: NEGATIVE
LDLC SERPL CALC-MCNC: 167 MG/DL
LEUKOCYTE ESTERASE URINE: NEGATIVE
LYMPHOCYTES # BLD AUTO: 1.77 K/UL
LYMPHOCYTES NFR BLD AUTO: 20.5 %
MAN DIFF?: NORMAL
MCHC RBC-ENTMCNC: 27.5 PG
MCHC RBC-ENTMCNC: 32 GM/DL
MCV RBC AUTO: 86 FL
MONOCYTES # BLD AUTO: 0.68 K/UL
MONOCYTES NFR BLD AUTO: 7.9 %
NEUTROPHILS # BLD AUTO: 5.92 K/UL
NEUTROPHILS NFR BLD AUTO: 68.6 %
NITRITE URINE: NEGATIVE
NONHDLC SERPL-MCNC: 210 MG/DL
PH URINE: 7
PLATELET # BLD AUTO: 283 K/UL
POTASSIUM SERPL-SCNC: 5.6 MMOL/L
PROT SERPL-MCNC: 6.8 G/DL
PROTEIN URINE: NEGATIVE
RBC # BLD: 4.87 M/UL
RBC # FLD: 15.5 %
SODIUM SERPL-SCNC: 141 MMOL/L
SPECIFIC GRAVITY URINE: 1.02
T3RU NFR SERPL: 1 TBI
T4 SERPL-MCNC: 7.6 UG/DL
TRIGL SERPL-MCNC: 214 MG/DL
TSH SERPL-ACNC: 2.07 UIU/ML
URATE SERPL-MCNC: 5.5 MG/DL
UROBILINOGEN URINE: NORMAL
WBC # FLD AUTO: 8.63 K/UL

## 2022-10-27 LAB — VIT B12 SERPL-MCNC: >2000 PG/ML

## 2022-10-28 DIAGNOSIS — G31.84 MILD COGNITIVE IMPAIRMENT, SO STATED: ICD-10-CM

## 2022-10-28 LAB — T PALLIDUM AB SER QL IA: NEGATIVE

## 2022-11-29 DIAGNOSIS — Z98.890 OTHER SPECIFIED POSTPROCEDURAL STATES: ICD-10-CM

## 2023-04-12 ENCOUNTER — RX RENEWAL (OUTPATIENT)
Age: 83
End: 2023-04-12

## 2023-04-12 DIAGNOSIS — R21 RASH AND OTHER NONSPECIFIC SKIN ERUPTION: ICD-10-CM

## 2023-05-15 ENCOUNTER — RX RENEWAL (OUTPATIENT)
Age: 83
End: 2023-05-15

## 2023-08-16 ENCOUNTER — RX RENEWAL (OUTPATIENT)
Age: 83
End: 2023-08-16

## 2023-08-21 ENCOUNTER — NON-APPOINTMENT (OUTPATIENT)
Age: 83
End: 2023-08-21

## 2023-09-18 ENCOUNTER — NON-APPOINTMENT (OUTPATIENT)
Age: 83
End: 2023-09-18

## 2023-10-06 ENCOUNTER — LABORATORY RESULT (OUTPATIENT)
Age: 83
End: 2023-10-06

## 2023-10-06 ENCOUNTER — APPOINTMENT (OUTPATIENT)
Dept: INTERNAL MEDICINE | Facility: CLINIC | Age: 83
End: 2023-10-06
Payer: MEDICARE

## 2023-10-06 ENCOUNTER — NON-APPOINTMENT (OUTPATIENT)
Age: 83
End: 2023-10-06

## 2023-10-06 VITALS — DIASTOLIC BLOOD PRESSURE: 70 MMHG | SYSTOLIC BLOOD PRESSURE: 118 MMHG

## 2023-10-06 VITALS
SYSTOLIC BLOOD PRESSURE: 118 MMHG | HEIGHT: 63.5 IN | WEIGHT: 173 LBS | BODY MASS INDEX: 30.27 KG/M2 | DIASTOLIC BLOOD PRESSURE: 70 MMHG

## 2023-10-06 DIAGNOSIS — R26.81 UNSTEADINESS ON FEET: ICD-10-CM

## 2023-10-06 DIAGNOSIS — R60.1 GENERALIZED EDEMA: ICD-10-CM

## 2023-10-06 DIAGNOSIS — I34.0 NONRHEUMATIC MITRAL (VALVE) INSUFFICIENCY: ICD-10-CM

## 2023-10-06 DIAGNOSIS — F32.A DEPRESSION, UNSPECIFIED: ICD-10-CM

## 2023-10-06 DIAGNOSIS — R60.9 EDEMA, UNSPECIFIED: ICD-10-CM

## 2023-10-06 DIAGNOSIS — M54.16 RADICULOPATHY, LUMBAR REGION: ICD-10-CM

## 2023-10-06 DIAGNOSIS — Z00.00 ENCOUNTER FOR GENERAL ADULT MEDICAL EXAMINATION W/OUT ABNORMAL FINDINGS: ICD-10-CM

## 2023-10-06 PROCEDURE — 90662 IIV NO PRSV INCREASED AG IM: CPT

## 2023-10-06 PROCEDURE — 99214 OFFICE O/P EST MOD 30 MIN: CPT | Mod: 25

## 2023-10-06 PROCEDURE — G0008: CPT

## 2023-10-06 PROCEDURE — 93000 ELECTROCARDIOGRAM COMPLETE: CPT

## 2023-10-06 PROCEDURE — G0439: CPT

## 2023-10-06 PROCEDURE — 36415 COLL VENOUS BLD VENIPUNCTURE: CPT

## 2023-10-07 LAB
25(OH)D3 SERPL-MCNC: 66.7 NG/ML
ALBUMIN SERPL ELPH-MCNC: 4.4 G/DL
ALP BLD-CCNC: 51 U/L
ALT SERPL-CCNC: 18 U/L
ANION GAP SERPL CALC-SCNC: 12 MMOL/L
AST SERPL-CCNC: 19 U/L
BASOPHILS # BLD AUTO: 0.09 K/UL
BASOPHILS NFR BLD AUTO: 1.2 %
BILIRUB SERPL-MCNC: 0.5 MG/DL
BUN SERPL-MCNC: 14 MG/DL
CALCIUM SERPL-MCNC: 9.9 MG/DL
CHLORIDE SERPL-SCNC: 102 MMOL/L
CHOLEST SERPL-MCNC: 205 MG/DL
CK SERPL-CCNC: 58 U/L
CO2 SERPL-SCNC: 24 MMOL/L
CREAT SERPL-MCNC: 0.82 MG/DL
EGFR: 71 ML/MIN/1.73M2
EOSINOPHIL # BLD AUTO: 0.06 K/UL
EOSINOPHIL NFR BLD AUTO: 0.8 %
ESTIMATED AVERAGE GLUCOSE: 123 MG/DL
GLUCOSE SERPL-MCNC: 111 MG/DL
HBA1C MFR BLD HPLC: 5.9 %
HCT VFR BLD CALC: 43.3 %
HDLC SERPL-MCNC: 42 MG/DL
HGB BLD-MCNC: 13.8 G/DL
IMM GRANULOCYTES NFR BLD AUTO: 0.3 %
LDLC SERPL CALC-MCNC: 124 MG/DL
LYMPHOCYTES # BLD AUTO: 1.3 K/UL
LYMPHOCYTES NFR BLD AUTO: 17.5 %
MAGNESIUM SERPL-MCNC: 2.3 MG/DL
MAN DIFF?: NORMAL
MCHC RBC-ENTMCNC: 28.8 PG
MCHC RBC-ENTMCNC: 31.9 GM/DL
MCV RBC AUTO: 90.4 FL
MONOCYTES # BLD AUTO: 0.6 K/UL
MONOCYTES NFR BLD AUTO: 8.1 %
NEUTROPHILS # BLD AUTO: 5.34 K/UL
NEUTROPHILS NFR BLD AUTO: 72.1 %
NONHDLC SERPL-MCNC: 163 MG/DL
PLATELET # BLD AUTO: 221 K/UL
POTASSIUM SERPL-SCNC: 4.6 MMOL/L
PROT SERPL-MCNC: 6.5 G/DL
RBC # BLD: 4.79 M/UL
RBC # FLD: 14.3 %
SODIUM SERPL-SCNC: 138 MMOL/L
T3RU NFR SERPL: 1 TBI
T4 SERPL-MCNC: 6.9 UG/DL
TRIGL SERPL-MCNC: 220 MG/DL
TSH SERPL-ACNC: 1.86 UIU/ML
URATE SERPL-MCNC: 6 MG/DL
VIT B12 SERPL-MCNC: >2000 PG/ML
WBC # FLD AUTO: 7.41 K/UL

## 2023-10-10 LAB
APPEARANCE: CLEAR
BACTERIA: ABNORMAL /HPF
BILIRUBIN URINE: NEGATIVE
BLOOD URINE: NEGATIVE
CAST: 0 /LPF
COLOR: YELLOW
EPITHELIAL CELLS: 1 /HPF
GLUCOSE QUALITATIVE U: NEGATIVE MG/DL
KETONES URINE: NEGATIVE MG/DL
LEUKOCYTE ESTERASE URINE: ABNORMAL
MICROSCOPIC-UA: NORMAL
NITRITE URINE: POSITIVE
PH URINE: 6
PROTEIN URINE: NEGATIVE MG/DL
RED BLOOD CELLS URINE: 0 /HPF
SPECIFIC GRAVITY URINE: 1.01
UROBILINOGEN URINE: 0.2 MG/DL
WHITE BLOOD CELLS URINE: 2 /HPF

## 2023-10-12 DIAGNOSIS — R53.1 WEAKNESS: ICD-10-CM

## 2023-10-12 LAB
ACRM BINDING ANTIBODY: 0 NMOL/L
INTERPRETATION MG: NORMAL
VGCC-P/Q BIND AB SER-SCNC: 0 NMOL/L

## 2023-10-23 ENCOUNTER — APPOINTMENT (OUTPATIENT)
Dept: VASCULAR SURGERY | Facility: CLINIC | Age: 83
End: 2023-10-23

## 2023-11-20 ENCOUNTER — APPOINTMENT (OUTPATIENT)
Dept: INTERNAL MEDICINE | Facility: CLINIC | Age: 83
End: 2023-11-20
Payer: MEDICARE

## 2023-11-20 VITALS
SYSTOLIC BLOOD PRESSURE: 120 MMHG | HEIGHT: 63 IN | WEIGHT: 170 LBS | BODY MASS INDEX: 30.12 KG/M2 | DIASTOLIC BLOOD PRESSURE: 70 MMHG

## 2023-11-20 DIAGNOSIS — R07.89 OTHER CHEST PAIN: ICD-10-CM

## 2023-11-20 DIAGNOSIS — K21.9 GASTRO-ESOPHAGEAL REFLUX DISEASE W/OUT ESOPHAGITIS: ICD-10-CM

## 2023-11-20 PROCEDURE — 99214 OFFICE O/P EST MOD 30 MIN: CPT | Mod: 25

## 2023-11-20 PROCEDURE — 81003 URINALYSIS AUTO W/O SCOPE: CPT | Mod: QW

## 2023-11-20 RX ORDER — TRIAMCINOLONE ACETONIDE 1 MG/G
0.1 CREAM TOPICAL
Qty: 15 | Refills: 0 | Status: DISCONTINUED | COMMUNITY
Start: 2023-04-12 | End: 2023-11-20

## 2023-11-20 RX ORDER — GABAPENTIN 100 MG/1
100 CAPSULE ORAL
Qty: 90 | Refills: 0 | Status: DISCONTINUED | COMMUNITY
Start: 2021-09-17 | End: 2023-11-20

## 2023-11-21 LAB
BILIRUB UR QL STRIP: NORMAL
CLARITY UR: CLEAR
COLLECTION METHOD: NORMAL
GLUCOSE UR-MCNC: NORMAL
HCG UR QL: 0.2 EU/DL
HGB UR QL STRIP.AUTO: NORMAL
KETONES UR-MCNC: NORMAL
LEUKOCYTE ESTERASE UR QL STRIP: NORMAL
NITRITE UR QL STRIP: POSITIVE
PH UR STRIP: 6.5
PROT UR STRIP-MCNC: NORMAL
SP GR UR STRIP: 1.01

## 2023-11-24 ENCOUNTER — APPOINTMENT (OUTPATIENT)
Dept: CT IMAGING | Facility: CLINIC | Age: 83
End: 2023-11-24
Payer: SELF-PAY

## 2023-11-24 ENCOUNTER — OUTPATIENT (OUTPATIENT)
Dept: OUTPATIENT SERVICES | Facility: HOSPITAL | Age: 83
LOS: 1 days | End: 2023-11-24
Payer: SELF-PAY

## 2023-11-24 DIAGNOSIS — Z98.890 OTHER SPECIFIED POSTPROCEDURAL STATES: Chronic | ICD-10-CM

## 2023-11-24 DIAGNOSIS — R60.1 GENERALIZED EDEMA: ICD-10-CM

## 2023-11-24 DIAGNOSIS — E78.00 PURE HYPERCHOLESTEROLEMIA, UNSPECIFIED: ICD-10-CM

## 2023-11-24 PROCEDURE — 75571 CT HRT W/O DYE W/CA TEST: CPT

## 2023-11-24 PROCEDURE — 75571 CT HRT W/O DYE W/CA TEST: CPT | Mod: 26

## 2023-11-27 DIAGNOSIS — E78.00 PURE HYPERCHOLESTEROLEMIA, UNSPECIFIED: ICD-10-CM

## 2023-11-27 DIAGNOSIS — R91.8 OTHER NONSPECIFIC ABNORMAL FINDING OF LUNG FIELD: ICD-10-CM

## 2023-11-29 PROBLEM — R91.8 PULMONARY NODULES: Status: ACTIVE | Noted: 2023-11-29

## 2023-12-19 RX ORDER — HALOBETASOL PROPIONATE 0.5 MG/G
0.05 CREAM TOPICAL TWICE DAILY
Qty: 1 | Refills: 2 | Status: ACTIVE | COMMUNITY
Start: 2020-04-10 | End: 1900-01-01

## 2024-02-27 ENCOUNTER — NON-APPOINTMENT (OUTPATIENT)
Age: 84
End: 2024-02-27

## 2024-04-25 ENCOUNTER — NON-APPOINTMENT (OUTPATIENT)
Age: 84
End: 2024-04-25

## 2024-05-17 ENCOUNTER — APPOINTMENT (OUTPATIENT)
Dept: INTERNAL MEDICINE | Facility: CLINIC | Age: 84
End: 2024-05-17
Payer: MEDICARE

## 2024-05-17 VITALS
HEIGHT: 63 IN | SYSTOLIC BLOOD PRESSURE: 120 MMHG | BODY MASS INDEX: 30.48 KG/M2 | WEIGHT: 172.05 LBS | DIASTOLIC BLOOD PRESSURE: 72 MMHG

## 2024-05-17 DIAGNOSIS — M48.07 SPINAL STENOSIS, LUMBOSACRAL REGION: ICD-10-CM

## 2024-05-17 DIAGNOSIS — R26.89 OTHER ABNORMALITIES OF GAIT AND MOBILITY: ICD-10-CM

## 2024-05-17 PROCEDURE — G2211 COMPLEX E/M VISIT ADD ON: CPT

## 2024-05-17 PROCEDURE — 99213 OFFICE O/P EST LOW 20 MIN: CPT

## 2024-05-17 RX ORDER — ASPIRIN 81 MG
81 TABLET, DELAYED RELEASE (ENTERIC COATED) ORAL DAILY
Refills: 0 | Status: ACTIVE | COMMUNITY

## 2024-05-17 RX ORDER — METOPROLOL TARTRATE 25 MG/1
25 TABLET, FILM COATED ORAL DAILY
Refills: 0 | Status: ACTIVE | COMMUNITY

## 2024-05-17 NOTE — PHYSICAL EXAM
[No JVD] : no jugular venous distention [Normal] : normal rate, regular rhythm, normal S1 and S2 and no murmur heard [No Edema] : there was no peripheral edema [de-identified] : Rather wide-based gait

## 2024-05-17 NOTE — HISTORY OF PRESENT ILLNESS
[FreeTextEntry1] : Patient comes in accompanied by her significant other.  Her major complaint is continued back pain with radicular component.  She also has loss of balance [de-identified] : Patient presented with multiple notes from pain specialists and numerous procedures and suggestions for pain medication.  Including narcotic patches.  Currently she is using marijuana Gummies

## 2024-05-17 NOTE — REVIEW OF SYSTEMS
[Recent Change In Weight] : ~T recent weight change [Back Pain] : back pain [Unsteady Walking] : ataxia [Negative] : Respiratory [FreeTextEntry2] : Gained weight

## 2024-05-17 NOTE — ASSESSMENT
[FreeTextEntry1] : Patient comes in accompanied by her significant other.  Her major complaint is continued back pain with radicular component.  She also has loss of balance  Patient has gained weight she is sedentary and taking marijuana.  We did caution her on this  I did review her MRI which is impressive.  At this point I think a surgical evaluation is important.  I gave her the name of a neuro spinal surgeon  She is normotensive she has no focal signs she is slightly wide-based and her gait which is probably due to her back disease  I will see her back for her annual physical

## 2024-06-05 ENCOUNTER — APPOINTMENT (OUTPATIENT)
Dept: ORTHOPEDIC SURGERY | Facility: CLINIC | Age: 84
End: 2024-06-05
Payer: MEDICARE

## 2024-06-05 DIAGNOSIS — M19.90 UNSPECIFIED OSTEOARTHRITIS, UNSPECIFIED SITE: ICD-10-CM

## 2024-06-05 PROCEDURE — 99214 OFFICE O/P EST MOD 30 MIN: CPT

## 2024-06-05 NOTE — ADDENDUM
[FreeTextEntry1] :  I, Daisy Woods, acted solely as a scribe for Dr. Lucius Fulton MD on this date 06/05/2024   All medical record entries made by the Scribe were at my, Dr. Lucius Fulton MD., direction and personally dictated by me on 06/05/2024. I have reviewed the chart and agree that the record accurately reflects my personal performance of the history, physical exam, assessment and plan. I have also personally directed, reviewed, and agreed with the chart.

## 2024-06-05 NOTE — HISTORY OF PRESENT ILLNESS
[de-identified] : Patient is a 83 year old female who presents for initial eval of chronic bp for about 5 years. Details pain in lower back radiating into coccyx and knee. Reports weakness and difficulty walking 5 blocks. Reports previous laminectomy in 2021, ambulating with cane since. Denies major improvement after this procedure. Taking

## 2024-06-05 NOTE — ASSESSMENT
[FreeTextEntry1] : I had a lengthy discussion with the patient in regard to their treatment plan and diagnosis. Their symptoms have persisted despite the conservative management they have attempted thus far. I discussed with the patient that surgical treatment is not the best course of action at this time. As a result, I would like to proceed with a referral to a pain management specialist to see if they are eligible for an epidural injection and other non-operative treatment options. In tandem with this they should continue with physical therapy/home therapy program. The patient can take Tylenol/NSAIDs as needed for pain control if medically able to. I will have patient follow-up in 4 to 5 weeks for repeat clinical evaluation. I encouraged them to follow-up sooner if their symptoms worsen or change in any way.

## 2024-06-05 NOTE — PHYSICAL EXAM
[de-identified] : Lumbar Physical Exam   Antalgic Gait, ambulates with cane  Forward pitch posture, can correct to neutral    Sagittal balance - Normal   Compensatory mechanism? - None      Reflexes    Patellar - normal    Gastroc - normal    Clonus - No    Hip Exam - Normal   Straight leg raise - none   Pulses - 2+ dp/pt   Range of motion - normal    Sensation   Sensation intact to light touch in L1, L2, L3, L4, L5 and S1 dermatomes bilaterally     Motor             IP Quad HS TA Gastroc EHL   Right 4/5  5/5   5/5 5/5 5/5 5/5       Left   4/5 5/5 5/5 3/5    3/5      3/5  [de-identified] : Lumbar MRI 2019 Reviewed  degenerative scoliosis L4-5 spondylolisthesis  Stand up MRI Reviewed  Lumbar Rads 2019 22 degrees lumbar lordosis  60 degrees pelvic incidence

## 2024-06-07 ENCOUNTER — NON-APPOINTMENT (OUTPATIENT)
Age: 84
End: 2024-06-07

## 2024-08-20 ENCOUNTER — LABORATORY RESULT (OUTPATIENT)
Age: 84
End: 2024-08-20

## 2024-08-20 ENCOUNTER — APPOINTMENT (OUTPATIENT)
Dept: INTERNAL MEDICINE | Facility: CLINIC | Age: 84
End: 2024-08-20
Payer: MEDICARE

## 2024-08-20 ENCOUNTER — NON-APPOINTMENT (OUTPATIENT)
Age: 84
End: 2024-08-20

## 2024-08-20 VITALS — SYSTOLIC BLOOD PRESSURE: 120 MMHG | DIASTOLIC BLOOD PRESSURE: 80 MMHG

## 2024-08-20 VITALS — WEIGHT: 173.05 LBS | HEIGHT: 63 IN | HEART RATE: 91 BPM | OXYGEN SATURATION: 95 % | BODY MASS INDEX: 30.66 KG/M2

## 2024-08-20 DIAGNOSIS — R60.9 EDEMA, UNSPECIFIED: ICD-10-CM

## 2024-08-20 DIAGNOSIS — M25.50 PAIN IN UNSPECIFIED JOINT: ICD-10-CM

## 2024-08-20 DIAGNOSIS — E78.00 PURE HYPERCHOLESTEROLEMIA, UNSPECIFIED: ICD-10-CM

## 2024-08-20 PROCEDURE — 99214 OFFICE O/P EST MOD 30 MIN: CPT

## 2024-08-20 PROCEDURE — 93000 ELECTROCARDIOGRAM COMPLETE: CPT

## 2024-08-20 PROCEDURE — G2211 COMPLEX E/M VISIT ADD ON: CPT

## 2024-08-20 PROCEDURE — 36415 COLL VENOUS BLD VENIPUNCTURE: CPT

## 2024-08-20 RX ORDER — EVOLOCUMAB 140 MG/ML
140 INJECTION, SOLUTION SUBCUTANEOUS
Refills: 0 | Status: ACTIVE | COMMUNITY

## 2024-08-20 NOTE — ASSESSMENT
[FreeTextEntry1] : This is a 83-year-old female who presents with 2 to 3 weeks of peripheral edema.  She has had no chest pain no shortness of breath and no calf tenderness.  She states she is seeing a cardiologist who did a scan and put her on Repatha.  As an aside she has been going to SNF over the last 2 to 3 days  She continues to have low back pain and is taking marijuana for pain relief  Physical exam is significant for peripheral edema and normal heart sounds clear lungs  Patient's cardiogram is normal.  We did order a BNP viral titers viral titers and a duplex of her lower extremities  We did assess her cholesterol profile as well  My impression is that this is most likely going to be peripheral vascular disease with venous insufficiency.  However we will rule out hypoalbuminemia nephrosis CHF and thrombotic disease  If this works out well and she has just peripheral vascular disease with a were asking for a GLP-1 agonist which I think is an excellent idea for her weight and discomfort

## 2024-08-20 NOTE — PHYSICAL EXAM
[No JVD] : no jugular venous distention [Normal] : normal rate, regular rhythm, normal S1 and S2 and no murmur heard [de-identified] : 2+ edema

## 2024-08-20 NOTE — HISTORY OF PRESENT ILLNESS
[FreeTextEntry8] : This is a 83-year-old female who presents with 2 to 3 weeks of peripheral edema.  She has had no chest pain no shortness of breath and no calf tenderness.  She states she is seeing a cardiologist who did a scan and put her on Repatha.  As an aside she has been going to SNF over the last 2 to 3 days  She continues to have low back pain and is taking marijuana for pain relief

## 2024-08-20 NOTE — REVIEW OF SYSTEMS
[Fatigue] : fatigue [Lower Ext Edema] : lower extremity edema [Back Pain] : back pain [Negative] : Respiratory [FreeTextEntry5] : History

## 2024-08-20 NOTE — PHYSICAL EXAM
[No JVD] : no jugular venous distention [Normal] : normal rate, regular rhythm, normal S1 and S2 and no murmur heard [de-identified] : 2+ edema

## 2024-08-21 DIAGNOSIS — U07.1 COVID-19: ICD-10-CM

## 2024-08-21 LAB
ALBUMIN SERPL ELPH-MCNC: 4.4 G/DL
ALP BLD-CCNC: 58 U/L
ALT SERPL-CCNC: 32 U/L
ANION GAP SERPL CALC-SCNC: 15 MMOL/L
AST SERPL-CCNC: 25 U/L
BASOPHILS # BLD AUTO: 0.1 K/UL
BASOPHILS NFR BLD AUTO: 1.3 %
BILIRUB SERPL-MCNC: 0.5 MG/DL
BUN SERPL-MCNC: 12 MG/DL
CALCIUM SERPL-MCNC: 9.8 MG/DL
CHLORIDE SERPL-SCNC: 104 MMOL/L
CHOLEST SERPL-MCNC: 152 MG/DL
CO2 SERPL-SCNC: 24 MMOL/L
CREAT SERPL-MCNC: 0.84 MG/DL
EGFR: 69 ML/MIN/1.73M2
EOSINOPHIL # BLD AUTO: 0.09 K/UL
EOSINOPHIL NFR BLD AUTO: 1.1 %
ESTIMATED AVERAGE GLUCOSE: 117 MG/DL
GLUCOSE SERPL-MCNC: 156 MG/DL
HBA1C MFR BLD HPLC: 5.7 %
HCT VFR BLD CALC: 45.8 %
HDLC SERPL-MCNC: 37 MG/DL
HGB BLD-MCNC: 14.2 G/DL
IMM GRANULOCYTES NFR BLD AUTO: 0.3 %
INFLUENZA A RESULT: NOT DETECTED
INFLUENZA B RESULT: NOT DETECTED
LDLC SERPL CALC-MCNC: 75 MG/DL
LYMPHOCYTES # BLD AUTO: 0.96 K/UL
LYMPHOCYTES NFR BLD AUTO: 12 %
MAN DIFF?: NORMAL
MCHC RBC-ENTMCNC: 29.8 PG
MCHC RBC-ENTMCNC: 31 GM/DL
MCV RBC AUTO: 96.2 FL
MONOCYTES # BLD AUTO: 0.58 K/UL
MONOCYTES NFR BLD AUTO: 7.3 %
NEUTROPHILS # BLD AUTO: 6.22 K/UL
NEUTROPHILS NFR BLD AUTO: 78 %
NONHDLC SERPL-MCNC: 115 MG/DL
NT-PROBNP SERPL-MCNC: 83 PG/ML
PLATELET # BLD AUTO: 243 K/UL
POTASSIUM SERPL-SCNC: 4.9 MMOL/L
PROT SERPL-MCNC: 6.5 G/DL
RBC # BLD: 4.76 M/UL
RBC # FLD: 13.7 %
RESP SYN VIRUS RESULT: NOT DETECTED
SARS-COV-2 RESULT: DETECTED
SODIUM SERPL-SCNC: 143 MMOL/L
T3RU NFR SERPL: 1 TBI
T4 SERPL-MCNC: 7.4 UG/DL
TRIGL SERPL-MCNC: 244 MG/DL
TSH SERPL-ACNC: 2.38 UIU/ML
URATE SERPL-MCNC: 6.7 MG/DL
WBC # FLD AUTO: 7.97 K/UL

## 2024-08-21 RX ORDER — NIRMATRELVIR AND RITONAVIR 300-100 MG
20 X 150 MG & KIT ORAL TWICE DAILY
Qty: 1 | Refills: 0 | Status: ACTIVE | COMMUNITY
Start: 2024-08-21 | End: 1900-01-01

## 2024-08-29 ENCOUNTER — APPOINTMENT (OUTPATIENT)
Dept: ULTRASOUND IMAGING | Facility: IMAGING CENTER | Age: 84
End: 2024-08-29
Payer: MEDICARE

## 2024-08-29 ENCOUNTER — OUTPATIENT (OUTPATIENT)
Dept: OUTPATIENT SERVICES | Facility: HOSPITAL | Age: 84
LOS: 1 days | End: 2024-08-29
Payer: MEDICARE

## 2024-08-29 DIAGNOSIS — E78.00 PURE HYPERCHOLESTEROLEMIA, UNSPECIFIED: ICD-10-CM

## 2024-08-29 DIAGNOSIS — Z98.890 OTHER SPECIFIED POSTPROCEDURAL STATES: Chronic | ICD-10-CM

## 2024-08-29 PROCEDURE — 93970 EXTREMITY STUDY: CPT

## 2024-08-29 PROCEDURE — 93970 EXTREMITY STUDY: CPT | Mod: 26

## 2024-09-25 ENCOUNTER — APPOINTMENT (OUTPATIENT)
Dept: VASCULAR SURGERY | Facility: CLINIC | Age: 84
End: 2024-09-25
Payer: MEDICARE

## 2024-09-25 VITALS
SYSTOLIC BLOOD PRESSURE: 136 MMHG | WEIGHT: 160 LBS | HEART RATE: 79 BPM | DIASTOLIC BLOOD PRESSURE: 78 MMHG | BODY MASS INDEX: 29.44 KG/M2 | HEIGHT: 62 IN | TEMPERATURE: 97.9 F

## 2024-09-25 PROCEDURE — 93970 EXTREMITY STUDY: CPT

## 2024-09-25 PROCEDURE — 99203 OFFICE O/P NEW LOW 30 MIN: CPT

## 2024-09-25 NOTE — PHYSICAL EXAM
[Normal Rate and Rhythm] : normal rate and rhythm [1+] : left 1+ [Ankle Swelling (On Exam)] : present [Ankle Swelling Bilaterally] : bilaterally  [Varicose Veins Of Lower Extremities] : bilaterally [Ankle Swelling On The Left] : moderate [Skin Induration] : induration

## 2024-10-18 ENCOUNTER — LABORATORY RESULT (OUTPATIENT)
Age: 84
End: 2024-10-18

## 2024-10-18 ENCOUNTER — APPOINTMENT (OUTPATIENT)
Dept: INTERNAL MEDICINE | Facility: CLINIC | Age: 84
End: 2024-10-18
Payer: MEDICARE

## 2024-10-18 VITALS
SYSTOLIC BLOOD PRESSURE: 110 MMHG | DIASTOLIC BLOOD PRESSURE: 70 MMHG | HEIGHT: 62.6 IN | BODY MASS INDEX: 31.06 KG/M2 | WEIGHT: 173.13 LBS

## 2024-10-18 DIAGNOSIS — E66.9 OBESITY, UNSPECIFIED: ICD-10-CM

## 2024-10-18 DIAGNOSIS — R91.1 SOLITARY PULMONARY NODULE: ICD-10-CM

## 2024-10-18 DIAGNOSIS — I34.0 NONRHEUMATIC MITRAL (VALVE) INSUFFICIENCY: ICD-10-CM

## 2024-10-18 DIAGNOSIS — E78.00 PURE HYPERCHOLESTEROLEMIA, UNSPECIFIED: ICD-10-CM

## 2024-10-18 DIAGNOSIS — F32.A DEPRESSION, UNSPECIFIED: ICD-10-CM

## 2024-10-18 DIAGNOSIS — Z00.00 ENCOUNTER FOR GENERAL ADULT MEDICAL EXAMINATION W/OUT ABNORMAL FINDINGS: ICD-10-CM

## 2024-10-18 DIAGNOSIS — M54.16 RADICULOPATHY, LUMBAR REGION: ICD-10-CM

## 2024-10-18 DIAGNOSIS — M48.07 SPINAL STENOSIS, LUMBOSACRAL REGION: ICD-10-CM

## 2024-10-18 PROCEDURE — 36415 COLL VENOUS BLD VENIPUNCTURE: CPT

## 2024-10-18 PROCEDURE — G0439: CPT

## 2024-10-18 PROCEDURE — 99212 OFFICE O/P EST SF 10 MIN: CPT | Mod: 25

## 2024-10-18 RX ORDER — TIRZEPATIDE 2.5 MG/.5ML
2.5 INJECTION, SOLUTION SUBCUTANEOUS
Qty: 1 | Refills: 1 | Status: ACTIVE | COMMUNITY
Start: 2024-10-18 | End: 1900-01-01

## 2024-10-20 LAB
25(OH)D3 SERPL-MCNC: 78.9 NG/ML
ALBUMIN SERPL ELPH-MCNC: 4.2 G/DL
ALP BLD-CCNC: 56 U/L
ALT SERPL-CCNC: 29 U/L
ANION GAP SERPL CALC-SCNC: 13 MMOL/L
AST SERPL-CCNC: 24 U/L
BASOPHILS # BLD AUTO: 0.09 K/UL
BASOPHILS NFR BLD AUTO: 1.2 %
BILIRUB SERPL-MCNC: 0.4 MG/DL
BUN SERPL-MCNC: 11 MG/DL
CALCIUM SERPL-MCNC: 10.1 MG/DL
CHLORIDE SERPL-SCNC: 102 MMOL/L
CHOLEST SERPL-MCNC: 129 MG/DL
CO2 SERPL-SCNC: 25 MMOL/L
CREAT SERPL-MCNC: 0.84 MG/DL
EGFR: 69 ML/MIN/1.73M2
EOSINOPHIL # BLD AUTO: 0.09 K/UL
EOSINOPHIL NFR BLD AUTO: 1.2 %
ESTIMATED AVERAGE GLUCOSE: 120 MG/DL
GLUCOSE SERPL-MCNC: 120 MG/DL
HBA1C MFR BLD HPLC: 5.8 %
HCT VFR BLD CALC: 43.4 %
HDLC SERPL-MCNC: 38 MG/DL
HGB BLD-MCNC: 13.9 G/DL
IMM GRANULOCYTES NFR BLD AUTO: 0.3 %
LDLC SERPL CALC-MCNC: 46 MG/DL
LYMPHOCYTES # BLD AUTO: 1.44 K/UL
LYMPHOCYTES NFR BLD AUTO: 19.8 %
MAGNESIUM SERPL-MCNC: 2.2 MG/DL
MAN DIFF?: NORMAL
MCHC RBC-ENTMCNC: 29.6 PG
MCHC RBC-ENTMCNC: 32 GM/DL
MCV RBC AUTO: 92.5 FL
MONOCYTES # BLD AUTO: 0.67 K/UL
MONOCYTES NFR BLD AUTO: 9.2 %
NEUTROPHILS # BLD AUTO: 4.96 K/UL
NEUTROPHILS NFR BLD AUTO: 68.3 %
NONHDLC SERPL-MCNC: 91 MG/DL
PLATELET # BLD AUTO: 257 K/UL
POTASSIUM SERPL-SCNC: 4.7 MMOL/L
PROT SERPL-MCNC: 6.6 G/DL
RBC # BLD: 4.69 M/UL
RBC # FLD: 13.7 %
SODIUM SERPL-SCNC: 140 MMOL/L
T3RU NFR SERPL: 1.1 TBI
T4 SERPL-MCNC: 6.9 UG/DL
TRIGL SERPL-MCNC: 292 MG/DL
TSH SERPL-ACNC: 1.86 UIU/ML
URATE SERPL-MCNC: 5.7 MG/DL
WBC # FLD AUTO: 7.27 K/UL

## 2024-10-24 LAB
APPEARANCE: CLEAR
BILIRUBIN URINE: NEGATIVE
BLOOD URINE: NEGATIVE
COLOR: YELLOW
GLUCOSE QUALITATIVE U: NEGATIVE MG/DL
KETONES URINE: NEGATIVE MG/DL
LEUKOCYTE ESTERASE URINE: ABNORMAL
NITRITE URINE: NEGATIVE
PH URINE: 6.5
PROTEIN URINE: NEGATIVE MG/DL
SPECIFIC GRAVITY URINE: 1.01
UROBILINOGEN URINE: 0.2 MG/DL

## 2024-10-31 NOTE — REVIEW OF SYSTEMS
[Fatigue] : fatigue [Nausea] : nausea [Back Pain] : back pain [Skin Rash] : skin rash Consistent Carbohydrate Diabetic Diets [Negative] : Psychiatric [FreeTextEntry4] : Abnormal taste

## 2024-11-15 ENCOUNTER — RESULT REVIEW (OUTPATIENT)
Age: 84
End: 2024-11-15

## 2024-11-15 ENCOUNTER — OUTPATIENT (OUTPATIENT)
Dept: OUTPATIENT SERVICES | Facility: HOSPITAL | Age: 84
LOS: 1 days | End: 2024-11-15
Payer: MEDICARE

## 2024-11-15 ENCOUNTER — APPOINTMENT (OUTPATIENT)
Dept: RADIOLOGY | Facility: IMAGING CENTER | Age: 84
End: 2024-11-15
Payer: MEDICARE

## 2024-11-15 ENCOUNTER — APPOINTMENT (OUTPATIENT)
Dept: MAMMOGRAPHY | Facility: IMAGING CENTER | Age: 84
End: 2024-11-15
Payer: MEDICARE

## 2024-11-15 ENCOUNTER — APPOINTMENT (OUTPATIENT)
Dept: CT IMAGING | Facility: IMAGING CENTER | Age: 84
End: 2024-11-15
Payer: MEDICARE

## 2024-11-15 DIAGNOSIS — F32.A DEPRESSION, UNSPECIFIED: ICD-10-CM

## 2024-11-15 DIAGNOSIS — I34.0 NONRHEUMATIC MITRAL (VALVE) INSUFFICIENCY: ICD-10-CM

## 2024-11-15 DIAGNOSIS — M19.90 UNSPECIFIED OSTEOARTHRITIS, UNSPECIFIED SITE: ICD-10-CM

## 2024-11-15 DIAGNOSIS — Z00.00 ENCOUNTER FOR GENERAL ADULT MEDICAL EXAMINATION WITHOUT ABNORMAL FINDINGS: ICD-10-CM

## 2024-11-15 DIAGNOSIS — E78.00 PURE HYPERCHOLESTEROLEMIA, UNSPECIFIED: ICD-10-CM

## 2024-11-15 DIAGNOSIS — Z98.890 OTHER SPECIFIED POSTPROCEDURAL STATES: Chronic | ICD-10-CM

## 2024-11-15 PROCEDURE — 71250 CT THORAX DX C-: CPT | Mod: 26,MH

## 2024-11-15 PROCEDURE — 77067 SCR MAMMO BI INCL CAD: CPT | Mod: 26

## 2024-11-15 PROCEDURE — 77085 DXA BONE DENSITY AXL VRT FX: CPT | Mod: 26

## 2024-11-15 PROCEDURE — 77063 BREAST TOMOSYNTHESIS BI: CPT | Mod: 26

## 2024-11-15 PROCEDURE — 71250 CT THORAX DX C-: CPT

## 2024-11-15 PROCEDURE — 77067 SCR MAMMO BI INCL CAD: CPT

## 2024-11-15 PROCEDURE — 77063 BREAST TOMOSYNTHESIS BI: CPT

## 2024-11-15 PROCEDURE — 77085 DXA BONE DENSITY AXL VRT FX: CPT

## 2024-11-18 DIAGNOSIS — M85.80 OTHER SPECIFIED DISORDERS OF BONE DENSITY AND STRUCTURE, UNSPECIFIED SITE: ICD-10-CM

## 2024-11-18 RX ORDER — ALENDRONATE SODIUM 70 MG/1
70 TABLET ORAL
Qty: 12 | Refills: 0 | Status: ACTIVE | COMMUNITY
Start: 2024-11-18 | End: 1900-01-01

## 2024-11-23 ENCOUNTER — RESULT REVIEW (OUTPATIENT)
Age: 84
End: 2024-11-23

## 2024-11-23 ENCOUNTER — APPOINTMENT (OUTPATIENT)
Dept: MAMMOGRAPHY | Facility: IMAGING CENTER | Age: 84
End: 2024-11-23
Payer: MEDICARE

## 2024-11-23 ENCOUNTER — OUTPATIENT (OUTPATIENT)
Dept: OUTPATIENT SERVICES | Facility: HOSPITAL | Age: 84
LOS: 1 days | End: 2024-11-23
Payer: MEDICARE

## 2024-11-23 DIAGNOSIS — Z00.8 ENCOUNTER FOR OTHER GENERAL EXAMINATION: ICD-10-CM

## 2024-11-23 DIAGNOSIS — Z98.890 OTHER SPECIFIED POSTPROCEDURAL STATES: Chronic | ICD-10-CM

## 2024-11-23 PROCEDURE — G0279: CPT | Mod: 26

## 2024-11-23 PROCEDURE — 77065 DX MAMMO INCL CAD UNI: CPT | Mod: 26,LT

## 2024-11-23 PROCEDURE — 77065 DX MAMMO INCL CAD UNI: CPT

## 2024-11-23 PROCEDURE — G0279: CPT

## 2024-12-09 ENCOUNTER — NON-APPOINTMENT (OUTPATIENT)
Age: 84
End: 2024-12-09

## 2025-02-12 ENCOUNTER — LABORATORY RESULT (OUTPATIENT)
Age: 85
End: 2025-02-12

## 2025-02-12 ENCOUNTER — APPOINTMENT (OUTPATIENT)
Dept: INTERNAL MEDICINE | Facility: CLINIC | Age: 85
End: 2025-02-12
Payer: MEDICARE

## 2025-02-12 VITALS — BODY MASS INDEX: 28.71 KG/M2 | WEIGHT: 160 LBS | HEIGHT: 62.6 IN

## 2025-02-12 DIAGNOSIS — R26.89 OTHER ABNORMALITIES OF GAIT AND MOBILITY: ICD-10-CM

## 2025-02-12 DIAGNOSIS — F32.A DEPRESSION, UNSPECIFIED: ICD-10-CM

## 2025-02-12 DIAGNOSIS — M48.07 SPINAL STENOSIS, LUMBOSACRAL REGION: ICD-10-CM

## 2025-02-12 DIAGNOSIS — E78.00 PURE HYPERCHOLESTEROLEMIA, UNSPECIFIED: ICD-10-CM

## 2025-02-12 DIAGNOSIS — B02.23 POSTHERPETIC POLYNEUROPATHY: ICD-10-CM

## 2025-02-12 DIAGNOSIS — E66.9 OBESITY, UNSPECIFIED: ICD-10-CM

## 2025-02-12 PROCEDURE — 36415 COLL VENOUS BLD VENIPUNCTURE: CPT | Mod: PD

## 2025-02-12 PROCEDURE — 99214 OFFICE O/P EST MOD 30 MIN: CPT

## 2025-02-12 PROCEDURE — G2211 COMPLEX E/M VISIT ADD ON: CPT

## 2025-02-12 RX ORDER — METRONIDAZOLE 7.5 MG/G
0.75 GEL TOPICAL TWICE DAILY
Refills: 0 | Status: ACTIVE | COMMUNITY

## 2025-02-12 RX ORDER — CLINDAMYCIN PHOSPHATE 1 G/10ML
1 GEL TOPICAL TWICE DAILY
Refills: 0 | Status: ACTIVE | COMMUNITY

## 2025-02-12 RX ORDER — VALACYCLOVIR 1 G/1
1 TABLET, FILM COATED ORAL 3 TIMES DAILY
Qty: 21 | Refills: 0 | Status: ACTIVE | COMMUNITY
Start: 2025-02-12 | End: 1900-01-01

## 2025-02-12 RX ORDER — TRAZODONE HYDROCHLORIDE 50 MG/1
50 TABLET ORAL
Qty: 90 | Refills: 1 | Status: ACTIVE | COMMUNITY
Start: 2025-02-12 | End: 1900-01-01

## 2025-02-13 ENCOUNTER — INPATIENT (INPATIENT)
Facility: HOSPITAL | Age: 85
LOS: 6 days | Discharge: NURSING, REHAB OR LONG-TERM CARE | DRG: 563 | End: 2025-02-20
Attending: HOSPITALIST | Admitting: STUDENT IN AN ORGANIZED HEALTH CARE EDUCATION/TRAINING PROGRAM
Payer: MEDICARE

## 2025-02-13 VITALS
WEIGHT: 160.06 LBS | HEIGHT: 63 IN | RESPIRATION RATE: 18 BRPM | TEMPERATURE: 98 F | HEART RATE: 90 BPM | OXYGEN SATURATION: 97 % | DIASTOLIC BLOOD PRESSURE: 81 MMHG | SYSTOLIC BLOOD PRESSURE: 143 MMHG

## 2025-02-13 DIAGNOSIS — S82.899A OTHER FRACTURE OF UNSPECIFIED LOWER LEG, INITIAL ENCOUNTER FOR CLOSED FRACTURE: ICD-10-CM

## 2025-02-13 DIAGNOSIS — B02.9 ZOSTER WITHOUT COMPLICATIONS: ICD-10-CM

## 2025-02-13 DIAGNOSIS — S92.353A DISPLACED FRACTURE OF FIFTH METATARSAL BONE, UNSPECIFIED FOOT, INITIAL ENCOUNTER FOR CLOSED FRACTURE: ICD-10-CM

## 2025-02-13 DIAGNOSIS — Z98.890 OTHER SPECIFIED POSTPROCEDURAL STATES: Chronic | ICD-10-CM

## 2025-02-13 DIAGNOSIS — Z29.9 ENCOUNTER FOR PROPHYLACTIC MEASURES, UNSPECIFIED: ICD-10-CM

## 2025-02-13 LAB
ALBUMIN SERPL ELPH-MCNC: 4 G/DL — SIGNIFICANT CHANGE UP (ref 3.3–5)
ALBUMIN SERPL ELPH-MCNC: 4.4 G/DL
ALP BLD-CCNC: 61 U/L
ALP SERPL-CCNC: 58 U/L — SIGNIFICANT CHANGE UP (ref 40–120)
ALT FLD-CCNC: 35 U/L — SIGNIFICANT CHANGE UP (ref 10–45)
ALT SERPL-CCNC: 30 U/L
ANION GAP SERPL CALC-SCNC: 11 MMOL/L — SIGNIFICANT CHANGE UP (ref 5–17)
ANION GAP SERPL CALC-SCNC: 13 MMOL/L
AST SERPL-CCNC: 25 U/L
AST SERPL-CCNC: 44 U/L — HIGH (ref 10–40)
BASOPHILS # BLD AUTO: 0.07 K/UL
BASOPHILS # BLD AUTO: 0.15 K/UL — SIGNIFICANT CHANGE UP (ref 0–0.2)
BASOPHILS NFR BLD AUTO: 1.1 %
BASOPHILS NFR BLD AUTO: 1.9 % — SIGNIFICANT CHANGE UP (ref 0–2)
BILIRUB SERPL-MCNC: 0.6 MG/DL
BILIRUB SERPL-MCNC: 0.6 MG/DL — SIGNIFICANT CHANGE UP (ref 0.2–1.2)
BUN SERPL-MCNC: 11 MG/DL
BUN SERPL-MCNC: 11 MG/DL — SIGNIFICANT CHANGE UP (ref 7–23)
CALCIUM SERPL-MCNC: 10 MG/DL
CALCIUM SERPL-MCNC: 9.5 MG/DL — SIGNIFICANT CHANGE UP (ref 8.4–10.5)
CHLORIDE SERPL-SCNC: 101 MMOL/L
CHLORIDE SERPL-SCNC: 102 MMOL/L — SIGNIFICANT CHANGE UP (ref 96–108)
CHOLEST SERPL-MCNC: 142 MG/DL
CO2 SERPL-SCNC: 24 MMOL/L
CO2 SERPL-SCNC: 25 MMOL/L — SIGNIFICANT CHANGE UP (ref 22–31)
CREAT SERPL-MCNC: 0.76 MG/DL — SIGNIFICANT CHANGE UP (ref 0.5–1.3)
CREAT SERPL-MCNC: 0.79 MG/DL
EGFR: 74 ML/MIN/1.73M2
EGFR: 77 ML/MIN/1.73M2 — SIGNIFICANT CHANGE UP
EOSINOPHIL # BLD AUTO: 0 K/UL — SIGNIFICANT CHANGE UP (ref 0–0.5)
EOSINOPHIL # BLD AUTO: 0.04 K/UL
EOSINOPHIL NFR BLD AUTO: 0 % — SIGNIFICANT CHANGE UP (ref 0–6)
EOSINOPHIL NFR BLD AUTO: 0.6 %
ESTIMATED AVERAGE GLUCOSE: 120 MG/DL
GLUCOSE SERPL-MCNC: 124 MG/DL
GLUCOSE SERPL-MCNC: 138 MG/DL — HIGH (ref 70–99)
HBA1C MFR BLD HPLC: 5.8 %
HCT VFR BLD CALC: 43.3 %
HCT VFR BLD CALC: 45.1 % — HIGH (ref 34.5–45)
HDLC SERPL-MCNC: 38 MG/DL
HGB BLD-MCNC: 14.3 G/DL
HGB BLD-MCNC: 14.4 G/DL — SIGNIFICANT CHANGE UP (ref 11.5–15.5)
IMM GRANULOCYTES NFR BLD AUTO: 0.2 %
LDLC SERPL CALC-MCNC: 64 MG/DL
LYMPHOCYTES # BLD AUTO: 0.76 K/UL — LOW (ref 1–3.3)
LYMPHOCYTES # BLD AUTO: 0.88 K/UL
LYMPHOCYTES # BLD AUTO: 9.4 % — LOW (ref 13–44)
LYMPHOCYTES NFR BLD AUTO: 13.2 %
MAGNESIUM SERPL-MCNC: 2.3 MG/DL
MAN DIFF?: NORMAL
MANUAL SMEAR VERIFICATION: SIGNIFICANT CHANGE UP
MCHC RBC-ENTMCNC: 29.8 PG — SIGNIFICANT CHANGE UP (ref 27–34)
MCHC RBC-ENTMCNC: 30.1 PG
MCHC RBC-ENTMCNC: 31.9 G/DL — LOW (ref 32–36)
MCHC RBC-ENTMCNC: 33 G/DL
MCV RBC AUTO: 91.2 FL
MCV RBC AUTO: 93.4 FL — SIGNIFICANT CHANGE UP (ref 80–100)
MONOCYTES # BLD AUTO: 0.53 K/UL — SIGNIFICANT CHANGE UP (ref 0–0.9)
MONOCYTES # BLD AUTO: 0.72 K/UL
MONOCYTES NFR BLD AUTO: 10.8 %
MONOCYTES NFR BLD AUTO: 6.5 % — SIGNIFICANT CHANGE UP (ref 2–14)
MYELOCYTES NFR BLD: 0.9 % — HIGH (ref 0–0)
NEUTROPHILS # BLD AUTO: 4.94 K/UL
NEUTROPHILS # BLD AUTO: 6.61 K/UL — SIGNIFICANT CHANGE UP (ref 1.8–7.4)
NEUTROPHILS NFR BLD AUTO: 74.1 %
NEUTROPHILS NFR BLD AUTO: 79.4 % — HIGH (ref 43–77)
NEUTS BAND # BLD: 1.9 % — SIGNIFICANT CHANGE UP (ref 0–8)
NEUTS BAND NFR BLD: 1.9 % — SIGNIFICANT CHANGE UP (ref 0–8)
NONHDLC SERPL-MCNC: 104 MG/DL
PLAT MORPH BLD: NORMAL — SIGNIFICANT CHANGE UP
PLATELET # BLD AUTO: 217 K/UL — SIGNIFICANT CHANGE UP (ref 150–400)
PLATELET # BLD AUTO: 245 K/UL
POTASSIUM SERPL-MCNC: 5.2 MMOL/L — SIGNIFICANT CHANGE UP (ref 3.5–5.3)
POTASSIUM SERPL-SCNC: 4.6 MMOL/L
POTASSIUM SERPL-SCNC: 5.2 MMOL/L — SIGNIFICANT CHANGE UP (ref 3.5–5.3)
PROT SERPL-MCNC: 6.7 G/DL
PROT SERPL-MCNC: 6.9 G/DL — SIGNIFICANT CHANGE UP (ref 6–8.3)
RBC # BLD: 4.75 M/UL
RBC # BLD: 4.83 M/UL — SIGNIFICANT CHANGE UP (ref 3.8–5.2)
RBC # FLD: 14 %
RBC # FLD: 14 % — SIGNIFICANT CHANGE UP (ref 10.3–14.5)
RBC BLD AUTO: SIGNIFICANT CHANGE UP
SODIUM SERPL-SCNC: 137 MMOL/L
SODIUM SERPL-SCNC: 138 MMOL/L — SIGNIFICANT CHANGE UP (ref 135–145)
T3RU NFR SERPL: 1 TBI
T4 SERPL-MCNC: 7.9 UG/DL
TRIGL SERPL-MCNC: 245 MG/DL
TSH SERPL-ACNC: 2.11 UIU/ML
URATE SERPL-MCNC: 5.6 MG/DL
WBC # BLD: 8.13 K/UL — SIGNIFICANT CHANGE UP (ref 3.8–10.5)
WBC # FLD AUTO: 6.66 K/UL
WBC # FLD AUTO: 8.13 K/UL — SIGNIFICANT CHANGE UP (ref 3.8–10.5)

## 2025-02-13 PROCEDURE — 73610 X-RAY EXAM OF ANKLE: CPT | Mod: 26,RT

## 2025-02-13 PROCEDURE — 73700 CT LOWER EXTREMITY W/O DYE: CPT | Mod: 26,RT

## 2025-02-13 PROCEDURE — 73590 X-RAY EXAM OF LOWER LEG: CPT | Mod: 26,RT

## 2025-02-13 PROCEDURE — 99222 1ST HOSP IP/OBS MODERATE 55: CPT

## 2025-02-13 PROCEDURE — 73630 X-RAY EXAM OF FOOT: CPT | Mod: 26,RT

## 2025-02-13 PROCEDURE — 76377 3D RENDER W/INTRP POSTPROCES: CPT | Mod: 26

## 2025-02-13 PROCEDURE — 99285 EMERGENCY DEPT VISIT HI MDM: CPT | Mod: FS

## 2025-02-13 RX ORDER — METRONIDAZOLE 7.5 MG/G
1 GEL VAGINAL
Refills: 0 | DISCHARGE

## 2025-02-13 RX ORDER — KETOROLAC TROMETHAMINE 30 MG/ML
15 INJECTION, SOLUTION INTRAMUSCULAR; INTRAVENOUS ONCE
Refills: 0 | Status: DISCONTINUED | OUTPATIENT
Start: 2025-02-13 | End: 2025-02-13

## 2025-02-13 RX ORDER — ONDANSETRON HCL/PF 4 MG/2 ML
4 VIAL (ML) INJECTION EVERY 8 HOURS
Refills: 0 | Status: DISCONTINUED | OUTPATIENT
Start: 2025-02-13 | End: 2025-02-20

## 2025-02-13 RX ORDER — OXYCODONE HYDROCHLORIDE 30 MG/1
5 TABLET ORAL ONCE
Refills: 0 | Status: DISCONTINUED | OUTPATIENT
Start: 2025-02-13 | End: 2025-02-13

## 2025-02-13 RX ORDER — HYPROMELLOSE 0.4 %
1 DROPS OPHTHALMIC (EYE) THREE TIMES A DAY
Refills: 0 | Status: DISCONTINUED | OUTPATIENT
Start: 2025-02-13 | End: 2025-02-20

## 2025-02-13 RX ORDER — MAGNESIUM, ALUMINUM HYDROXIDE 200-200 MG
30 TABLET,CHEWABLE ORAL EVERY 4 HOURS
Refills: 0 | Status: DISCONTINUED | OUTPATIENT
Start: 2025-02-13 | End: 2025-02-20

## 2025-02-13 RX ORDER — MELATONIN 5 MG
3 TABLET ORAL AT BEDTIME
Refills: 0 | Status: DISCONTINUED | OUTPATIENT
Start: 2025-02-13 | End: 2025-02-15

## 2025-02-13 RX ORDER — ACETAMINOPHEN 500 MG/5ML
975 LIQUID (ML) ORAL ONCE
Refills: 0 | Status: COMPLETED | OUTPATIENT
Start: 2025-02-13 | End: 2025-02-13

## 2025-02-13 RX ORDER — DIAZEPAM 2 MG/1
5 TABLET ORAL ONCE
Refills: 0 | Status: DISCONTINUED | OUTPATIENT
Start: 2025-02-13 | End: 2025-02-13

## 2025-02-13 RX ORDER — TRAZODONE HCL 100 MG
0.5 TABLET ORAL
Refills: 0 | DISCHARGE

## 2025-02-13 RX ORDER — ASPIRIN 325 MG
1 TABLET ORAL
Refills: 0 | DISCHARGE

## 2025-02-13 RX ORDER — ACETAMINOPHEN 500 MG/5ML
650 LIQUID (ML) ORAL EVERY 6 HOURS
Refills: 0 | Status: DISCONTINUED | OUTPATIENT
Start: 2025-02-13 | End: 2025-02-20

## 2025-02-13 RX ORDER — CLINDAMYCIN 1 G/10ML
0 GEL TOPICAL
Refills: 0 | DISCHARGE

## 2025-02-13 RX ORDER — OXYCODONE HYDROCHLORIDE 30 MG/1
5 TABLET ORAL EVERY 6 HOURS
Refills: 0 | Status: DISCONTINUED | OUTPATIENT
Start: 2025-02-13 | End: 2025-02-15

## 2025-02-13 RX ORDER — B1/B2/B3/B5/B6/B12/VIT C/FOLIC 500-0.5 MG
1 TABLET ORAL DAILY
Refills: 0 | Status: DISCONTINUED | OUTPATIENT
Start: 2025-02-13 | End: 2025-02-20

## 2025-02-13 RX ORDER — ASPIRIN 325 MG
81 TABLET ORAL DAILY
Refills: 0 | Status: DISCONTINUED | OUTPATIENT
Start: 2025-02-13 | End: 2025-02-20

## 2025-02-13 RX ADMIN — Medication 975 MILLIGRAM(S): at 09:13

## 2025-02-13 RX ADMIN — Medication 3 MILLIGRAM(S): at 21:43

## 2025-02-13 RX ADMIN — Medication 1000 MILLIGRAM(S): at 10:30

## 2025-02-13 RX ADMIN — OXYCODONE HYDROCHLORIDE 5 MILLIGRAM(S): 30 TABLET ORAL at 23:36

## 2025-02-13 RX ADMIN — OXYCODONE HYDROCHLORIDE 5 MILLIGRAM(S): 30 TABLET ORAL at 12:31

## 2025-02-13 RX ADMIN — Medication 1000 MILLIGRAM(S): at 21:43

## 2025-02-13 RX ADMIN — KETOROLAC TROMETHAMINE 15 MILLIGRAM(S): 30 INJECTION, SOLUTION INTRAMUSCULAR; INTRAVENOUS at 14:02

## 2025-02-13 RX ADMIN — Medication 1 DROP(S): at 21:43

## 2025-02-13 RX ADMIN — Medication 975 MILLIGRAM(S): at 14:11

## 2025-02-13 RX ADMIN — OXYCODONE HYDROCHLORIDE 5 MILLIGRAM(S): 30 TABLET ORAL at 14:11

## 2025-02-13 NOTE — PHYSICAL THERAPY INITIAL EVALUATION ADULT - LEVEL OF INDEPENDENCE, REHAB EVAL
Called dental office and adv okay to put on azithromycin if needed, also refaxed paper with added note. contact guard no

## 2025-02-13 NOTE — H&P ADULT - NSHPLABSRESULTS_GEN_ALL_CORE
14.4   8.13  )-----------( 217      ( 13 Feb 2025 12:27 )             45.1     13 Feb 2025 12:27    138    |  102    |  11     ----------------------------<  138    5.2     |  25     |  0.76     Ca    9.5        13 Feb 2025 12:27    TPro  6.9    /  Alb  4.0    /  TBili  0.6    /  DBili  x      /  AST  44     /  ALT  35     /  AlkPhos  58     13 Feb 2025 12:27    LIVER FUNCTIONS - ( 13 Feb 2025 12:27 )  Alb: 4.0 g/dL / Pro: 6.9 g/dL / ALK PHOS: 58 U/L / ALT: 35 U/L / AST: 44 U/L / GGT: x             CAPILLARY BLOOD GLUCOSE            Urinalysis Basic - ( 13 Feb 2025 12:27 )    Color: x / Appearance: x / SG: x / pH: x  Gluc: 138 mg/dL / Ketone: x  / Bili: x / Urobili: x   Blood: x / Protein: x / Nitrite: x   Leuk Esterase: x / RBC: x / WBC x   Sq Epi: x / Non Sq Epi: x / Bacteria: x

## 2025-02-13 NOTE — PHYSICAL THERAPY INITIAL EVALUATION ADULT - PHYSICAL ASSIST/NONPHYSICAL ASSIST: STAND/SIT, REHAB EVAL
Dr. Auguste Kin spoke with patient's wife. Answered all questions. verbal cues/nonverbal cues (demo/gestures)/1 person assist

## 2025-02-13 NOTE — PHYSICAL THERAPY INITIAL EVALUATION ADULT - PERTINENT HX OF CURRENT PROBLEM, REHAB EVAL
84 yr old female to ED via EMS s/p fall from chair Pt has h/o spinal area c/o rt ankle pain . Denies head inj or LOC . Takes baby ASA. Has h/o spinal/back pain. Awake alert and orientedx4 Resp even and non lab Denies chest pain or SOB Denies fever or chills. Hospital course: (2/13) X-ray RLE/CT RLE: Status post splinting of a transverse impacted fracture of the lateral malleolus extending up to the tibiotalar joint. Predominantly vertical nondisplaced fracture of the medial malleolus extending to the posterior malleolus. Comminuted intra-articular fracture of the fifth metatarsal base which extends through the fifth metatarsal shaft and to the fifth metatarsal head/neck. Comminuted and impacted fractures of the third and fourth metatarsal head neck.

## 2025-02-13 NOTE — PATIENT PROFILE ADULT - NSPROPTRIGHTSUPPORTPERSON_GEN_A_NUR
same name as above Transposition Flap Text: The defect edges were debeveled with a #15 scalpel blade.  Given the location of the defect and the proximity to free margins a transposition flap was deemed most appropriate.  Using a sterile surgical marker, an appropriate transposition flap was drawn incorporating the defect.    The area thus outlined was incised deep to adipose tissue with a #15 scalpel blade.  The skin margins were undermined to an appropriate distance in all directions utilizing iris scissors.

## 2025-02-13 NOTE — H&P ADULT - PROBLEM SELECTOR PLAN 2
Crusting lesions over approx T2 dermatome on the right side  No pain currently  Continue Valtrex 1g TID for total 7 days  (2/12-2/19)

## 2025-02-13 NOTE — PHYSICAL THERAPY INITIAL EVALUATION ADULT - NSPTDISCHREC_GEN_A_CORE
If home, Pt. would need 24 hrs x 7 days assistance for bed, OOB mobility, ADLs, bed side commode, Poly fly WC with elevated leg rests., HPT/Sub-acute Rehab

## 2025-02-13 NOTE — ED PROVIDER NOTE - CARE PLAN
Principal Discharge DX:	Fracture of 5th metatarsal  Secondary Diagnosis:	Bimalleolar fracture of right ankle   1

## 2025-02-13 NOTE — PHYSICAL THERAPY INITIAL EVALUATION ADULT - PLANNED THERAPY INTERVENTIONS, PT EVAL
GOAL: Patient will be able to negotiate 13 steps in 2 weeks/bed mobility training/gait training/transfer training

## 2025-02-13 NOTE — ED PROVIDER NOTE - ATTENDING APP SHARED VISIT CONTRIBUTION OF CARE
Patient is 84-year-old female history of prior back surgery chronic back pain presented status post mechanical fall came by ambulance for right foot pain to the ankle and foot rule out fracture, no LOC not on blood thinners. Patient is 84-year-old female history of prior back surgery chronic back pain presented status post mechanical fall came by ambulance for right foot pain to the ankle and foot rule out fracture, no LOC not on blood thinners. pt also with r breast shingles on valtrex started yesterday.

## 2025-02-13 NOTE — PHYSICAL THERAPY INITIAL EVALUATION ADULT - ACTIVE RANGE OF MOTION EXAMINATION, REHAB EVAL
Except R ankle ( in splint )/bilateral upper extremity Active ROM was WFL (within functional limits)/bilateral  lower extremity Active ROM was WFL (within functional limits)

## 2025-02-13 NOTE — H&P ADULT - PROBLEM SELECTOR PLAN 4
MV, vitmain C, vitamin D, PPI  Regular diet  PT: NIK needs 3 MN    updated daughter and  at bedside   Discussed with med ACP

## 2025-02-13 NOTE — ED PROVIDER NOTE - PROGRESS NOTE DETAILS
Patient with zoster rash on her chest, 1 dermatoma.  Already initiated treatment with outpatient provider. -Alvin Bruner PA-C

## 2025-02-13 NOTE — H&P ADULT - HISTORY OF PRESENT ILLNESS
84F pmhx of chronic back pain, and anxiety who presents after several falls at home the most recent of which resulted in an ankle fracture.  She says that she went to see her PCP as her low back pain was getting worse, it was radiating down to the ankle and making her knee less stable resulting in buckling and falling. He suggested she see an outpatient neurosurgeon for evaluation.  She went home and had another mechanical fall due to her knee giving out and came to the ED for severe RLE pain.  She also has been on treatment for shingles.

## 2025-02-13 NOTE — H&P ADULT - NSHPPHYSICALEXAM_GEN_ALL_CORE
ICU Vital Signs Last 24 Hrs  T(C): 36.7 (13 Feb 2025 15:57), Max: 36.7 (13 Feb 2025 09:07)  T(F): 98.1 (13 Feb 2025 15:57), Max: 98.1 (13 Feb 2025 13:02)  HR: 82 (13 Feb 2025 15:57) (72 - 90)  BP: 99/62 (13 Feb 2025 15:57) (99/62 - 146/80)  BP(mean): 102 (13 Feb 2025 09:07) (102 - 102)  ABP: --  ABP(mean): --  RR: 18 (13 Feb 2025 15:57) (15 - 18)  SpO2: 95% (13 Feb 2025 15:57) (95% - 98%)    O2 Parameters below as of 13 Feb 2025 15:57  Patient On (Oxygen Delivery Method): room air

## 2025-02-13 NOTE — CONSULT NOTE ADULT - SUBJECTIVE AND OBJECTIVE BOX
Chief complaint: R ankle pain  · HPI Objective Statement: 84 yr old female to ED via EMS s/p fall from chair Pt has h/o spinal area c/o rt ankle pain . Denies head inj or LOC . Takes baby ASA. Has h/o spinal/back pain. Awake alert and orientedx4 Resp even and nonlab Denies chest pain or SOB Denies fever or chills.    PAST MEDICAL & SURGICAL HISTORY:  HLD (hyperlipidemia)      H/O laminectomy          MEDICATIONS  (STANDING):    MEDICATIONS  (PRN):      Allergies    sulfa drugs (Rash)  penicillin (Rash)    Intolerances        VITALS:    Vital Signs Last 24 Hrs  T(C): 36.7 (13 Feb 2025 09:07), Max: 36.7 (13 Feb 2025 09:07)  T(F): 98 (13 Feb 2025 09:07), Max: 98 (13 Feb 2025 09:07)  HR: 83 (13 Feb 2025 09:07) (83 - 90)  BP: 146/80 (13 Feb 2025 09:07) (143/81 - 146/80)  BP(mean): 102 (13 Feb 2025 09:07) (102 - 102)  RR: 16 (13 Feb 2025 09:07) (16 - 18)  SpO2: 97% (13 Feb 2025 09:07) (97% - 97%)    Parameters below as of 13 Feb 2025 09:07  Patient On (Oxygen Delivery Method): room air        LABS:                CAPILLARY BLOOD GLUCOSE              LOWER EXTREMITY PHYSICAL EXAM:    Vascular: DP/PT 2/4, B/L, CFT <3 seconds B/L, Temperature gradient warm to cool, B/L.   Neuro: Epicritic sensation intact to the level of digits, B/L.  Musculoskeletal/Ortho: RLE  localized tenderness to distal medial and lateral malleoli, localized tenderness along the 5th metatarsal neck and base, pain with ankle range of motion, pain with midtarsal joint range of motion  Skin: diffuse RLE 2+ pitting edema with lateral foot and ankle ecchymosis no skin tenting, no signs of compartment syndrome, no open wounds,.      RADIOLOGY & ADDITIONAL STUDIES:    < from: Xray Ankle Complete 3 Views, Right (02.13.25 @ 10:09) >    ACC: [id number] EXAM:  XR TIB FIB AP LAT 2 VIEWS RT   ORDERED BY: SIMONE REHMAN     ACC: [id number] EXAM:  XR ANKLE COMP MIN 3 VIEWS RT   ORDERED BY: SIMONE MELLO   SHERIE     ACC: [id number] EXAM:  XR FOOT COMP MIN 3 VIEWS RT   ORDERED BY: SIMONE FUNMILAYO   SHERIE     PROCEDURE DATE:  02/13/2025          INTERPRETATION:  CLINICAL INDICATION: Lower extremity pain.    TECHNIQUE: 2 views right tibia fibula; 3 views right ankle; 3 views right   foot.    COMPARISON: None.      FINDINGS/  IMPRESSION:  There is osteopenia.    Lucency at the base of the medial malleolus with additional subtle   lucency suspected at the base of the lateral malleolus compatible with   acute nondisplaced fractures. There is intra-articular extension of the   fracture line. Talar dome is smooth. No dislocation at the ankle.    Acute mildly displaced and impacted fracture at the fourth metatarsal   head neck junction. Acute nondisplaced fractures at the base of the fifth   metatarsal as well as at the fifth metatarsal midshaft and head neck   junction. There is also impaction of the fracture fragments at the head   neck junction. No dislocation at the foot.    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 No widening at the Lisfranc interval.    There is chondrocalcinosis at the knee. Knee joint spaces appears   maintained.    Vascular calcification is present. There is plantar and retrocalcaneal   enthesophyte formation. Soft tissue swelling about the ankle and foot.    --- End of Report ---            NIKOLE MONK MD; Attending Radiologist  This document has been electronically signed. Feb 13 2025 10:24AM    < end of copied text >

## 2025-02-13 NOTE — ED ADULT NURSE NOTE - OBJECTIVE STATEMENT
84 yr old female to ED via EMS s/p fall from chair Pt has h/o spinal area c/o rt ankle pain . Denies head inj or LOC . Takes baby ASA. Has h/o spinal/back pain. Awake alert and orientedx4 Resp even and nonlab Denies chest pain or SOB Denies fever or chills. To rt lower extremity Rt ankle swelling Palp DP cap refill wnl Palp DP anable to mive fingers . NPO, except meds Daughter at bedside.Stacey Sorensen 84 yr old female to ED via EMS s/p fall from chair Pt has h/o spinal area c/o rt ankle pain . Denies head inj or LOC . Takes baby ASA. Has h/o spinal/back pain. Awake alert and orientedx4 Resp even and nonlab Denies chest pain or SOB Denies fever or chills. To rt lower extremity Rt ankle swelling Palp DP cap refill wnl Palp DP unable to move fingers . NPO, except meds Daughter at bedside. Stacey Sorensen

## 2025-02-13 NOTE — H&P ADULT - PROBLEM SELECTOR PLAN 1
CT: showing  Status post splinting of a transverse impacted fracture of the   lateral malleolus extending up to the tibiotalar joint. Predominantly vertical nondisplaced fracture of the medial malleolus  extending to the posterior malleolus   Comminuted intra-articular fracture of the fifth metatarsal base which extends through the fifth metatarsal shaft and to the fifth metatarsal head/neck.    Comminuted and impacted fractures of the third and fourth metatarsal head neck.  - Splint applied by Podiatry Dressing to remain clean, dry, intact   - Patient to remain nonweightbearing to right lower extremity   - Recommend elevation of right lower extremity with 2 pillows    - Patient to follow up within 1 week with Dr. Guzman ([phone])   -Tylenol PRN mild pain, Oxycodone 5mg PRN severe pain

## 2025-02-13 NOTE — PHYSICAL THERAPY INITIAL EVALUATION ADULT - ADDITIONAL COMMENTS
Patient lives with her boyfriend in a private house with 13 MAX w/B/L handrails and 11 steps inside to reach bedroom on 2nd floor. PTA, pt. was independent in ADLs & ambulation w/o AD. As per patient's daughter, pt. has RW but does not use it; had multiple falls in the past 6 months; As per patient, she is alone during the day.

## 2025-02-13 NOTE — PATIENT PROFILE ADULT - FALL HARM RISK - HARM RISK INTERVENTIONS

## 2025-02-13 NOTE — PATIENT PROFILE ADULT - NSPRESCRALCSIXMORE_GEN_A_NUR
Three months post arthroplasty.  Doing well.  No signs of infection.  Patient is instructed to continue with strengthening exercises while being mindful of the inherent limitations of the implant.  Follow up as needed.       Less than monthly

## 2025-02-13 NOTE — ED PROVIDER NOTE - OBJECTIVE STATEMENT
81-year-old female past medical history of hyperlipidemia, chronic low back pain s/p laminectomy presenting with right ankle pain s/p fall.  Patient reports that she was attempting to get up from the chair when she tripped and fell, rolling her right ankle.  Patient states that she is chronically unsteady on her feet 2/2 back discomfort.  Patient denies head trauma or LOC.  Patient only complaining of severe right foot and ankle pain.  Denies palpitations, chest pain, shortness of breath, abdominal pain, vomiting, diarrhea, dysuria, fevers. 84-year-old female past medical history of hyperlipidemia, chronic low back pain s/p laminectomy presenting with right ankle pain s/p fall.  Patient reports that she was attempting to get up from the chair when she tripped and fell, rolling her right ankle.  Patient states that she is chronically unsteady on her feet 2/2 back discomfort.  Patient denies head trauma or LOC.  Patient only complaining of severe right foot and ankle pain.  Denies palpitations, chest pain, shortness of breath, abdominal pain, vomiting, diarrhea, dysuria, fevers.

## 2025-02-13 NOTE — CONSULT NOTE ADULT - ASSESSMENT
87F presents with Right foot and ankle pain  - Patient seen and evaluated   - Afebrile, Neurovascular status intact  -  RLE  localized tenderness to distal medial and lateral malleoli, localized tenderness along the 5th metatarsal neck and base, pain with ankle range of motion, pain with midtarsal joint range of motion  Skin: diffuse RLE 2+ pitting edema with lateral foot and ankle ecchymosis no skin tenting, no signs of compartment syndrome, no open wounds,.  - Right lower extremity xray: acute nondispalced fracture of distal medial and lateral malleoli, Acute mildly displaced and impacted fracture at the fourth metatarsal   head neck junction. Acute nondisplaced fractures at the base of the fifth metatarsal as well as at the fifth metatarsal midshaft and head neck junction.  - Recommend right lower extremity CT with 3D reconstruction   - Applied AO Splinty. Dressing to remain clean, dry, intact   - Patient to remain nonweightbearing to right lower extremity. Recommend PT evaluation   - Recommend elevation of right lower extremity with 2 pillows   - Recommend over the counter pain medication PRN   - Patient to follow up within 1 week with Dr. Guzman ([phone])  - Discussed with attending

## 2025-02-13 NOTE — H&P ADULT - PROBLEM SELECTOR PLAN 3
-Pt requesting to see neurosurgery for chonic back pain but cannot tolerate closed MRIs so need to defer to outpt evaluation.  At home Takes Tylenol PRN pain relief

## 2025-02-14 ENCOUNTER — TRANSCRIPTION ENCOUNTER (OUTPATIENT)
Age: 85
End: 2025-02-14

## 2025-02-14 LAB
APPEARANCE UR: CLEAR — SIGNIFICANT CHANGE UP
BACTERIA # UR AUTO: ABNORMAL /HPF
BILIRUB UR-MCNC: NEGATIVE — SIGNIFICANT CHANGE UP
CAST: 0 /LPF — SIGNIFICANT CHANGE UP (ref 0–4)
COLOR SPEC: YELLOW — SIGNIFICANT CHANGE UP
DIFF PNL FLD: NEGATIVE — SIGNIFICANT CHANGE UP
GLUCOSE UR QL: NEGATIVE MG/DL — SIGNIFICANT CHANGE UP
KETONES UR-MCNC: NEGATIVE MG/DL — SIGNIFICANT CHANGE UP
LEUKOCYTE ESTERASE UR-ACNC: ABNORMAL
NITRITE UR-MCNC: NEGATIVE — SIGNIFICANT CHANGE UP
PH UR: 6.5 — SIGNIFICANT CHANGE UP (ref 5–8)
PROT UR-MCNC: NEGATIVE MG/DL — SIGNIFICANT CHANGE UP
RBC CASTS # UR COMP ASSIST: 0 /HPF — SIGNIFICANT CHANGE UP (ref 0–4)
SP GR SPEC: 1.01 — SIGNIFICANT CHANGE UP (ref 1–1.03)
SQUAMOUS # UR AUTO: 4 /HPF — SIGNIFICANT CHANGE UP (ref 0–5)
UROBILINOGEN FLD QL: 1 MG/DL — SIGNIFICANT CHANGE UP (ref 0.2–1)
WBC UR QL: 9 /HPF — HIGH (ref 0–5)

## 2025-02-14 PROCEDURE — 99233 SBSQ HOSP IP/OBS HIGH 50: CPT

## 2025-02-14 RX ORDER — ENOXAPARIN SODIUM 100 MG/ML
40 INJECTION SUBCUTANEOUS EVERY 24 HOURS
Refills: 0 | Status: DISCONTINUED | OUTPATIENT
Start: 2025-02-14 | End: 2025-02-20

## 2025-02-14 RX ADMIN — Medication 1 TABLET(S): at 13:25

## 2025-02-14 RX ADMIN — Medication 1 DROP(S): at 13:25

## 2025-02-14 RX ADMIN — Medication 4 MILLIGRAM(S): at 08:31

## 2025-02-14 RX ADMIN — ENOXAPARIN SODIUM 40 MILLIGRAM(S): 100 INJECTION SUBCUTANEOUS at 17:43

## 2025-02-14 RX ADMIN — Medication 1 DROP(S): at 21:32

## 2025-02-14 RX ADMIN — OXYCODONE HYDROCHLORIDE 5 MILLIGRAM(S): 30 TABLET ORAL at 22:02

## 2025-02-14 RX ADMIN — Medication 1 DROP(S): at 05:56

## 2025-02-14 RX ADMIN — Medication 500 MILLIGRAM(S): at 13:25

## 2025-02-14 RX ADMIN — OXYCODONE HYDROCHLORIDE 5 MILLIGRAM(S): 30 TABLET ORAL at 21:32

## 2025-02-14 RX ADMIN — Medication 1000 MILLIGRAM(S): at 13:25

## 2025-02-14 RX ADMIN — Medication 81 MILLIGRAM(S): at 13:24

## 2025-02-14 RX ADMIN — OXYCODONE HYDROCHLORIDE 5 MILLIGRAM(S): 30 TABLET ORAL at 00:30

## 2025-02-14 RX ADMIN — Medication 1000 UNIT(S): at 13:25

## 2025-02-14 RX ADMIN — Medication 1000 MILLIGRAM(S): at 05:57

## 2025-02-14 RX ADMIN — Medication 40 MILLIGRAM(S): at 05:56

## 2025-02-14 RX ADMIN — Medication 1000 MILLIGRAM(S): at 21:32

## 2025-02-14 NOTE — DISCHARGE NOTE PROVIDER - NSDCMRMEDTOKEN_GEN_ALL_CORE_FT
aspirin 81 mg oral tablet: 1 tablet with sensor orally once a day  clindamycin 1% topical gel: apply topically to affected area 2 times a day  metroNIDAZOLE 0.75% topical cream: apply topically to affected area 2 times a day  Multivitamin Tablet: 1 tablet orally once a day  OTC Omeprazole Tablet: 1 tablet orally once a day  Refresh Eye Drops: 1 to 2 drops instilled in each eye 2 times a day  traZODone 50 mg oral tablet: 0.5 tab(s) orally once a day (at bedtime)  Tylenol Tablets: 2 tablets orally 2 times a day  valACYclovir 1 g oral tablet: 1 tab(s) orally 3 times a day for 7 days  Vitamin C Tablet: 1 tablet orally once a day  Vitamin D Tablet: 1 tablet orally once a day   acetaminophen 325 mg oral tablet: 2 tab(s) orally every 6 hours As needed Temp greater or equal to 38C (100.4F), Mild Pain (1 - 3)  aluminum hydroxide-magnesium hydroxide 200 mg-200 mg/5 mL oral suspension: 30 milliliter(s) orally every 4 hours As needed Dyspepsia  ascorbic acid 500 mg oral tablet: 1 tab(s) orally once a day  aspirin 81 mg oral tablet: 1 tablet with sensor orally once a day  cholecalciferol oral tablet: 1000 unit(s) orally once a day  clindamycin 1% topical gel: apply topically to affected area 2 times a day  melatonin 5 mg oral tablet: 1 tab(s) orally once a day (at bedtime)  metroNIDAZOLE 0.75% topical cream: apply topically to affected area 2 times a day  Multiple Vitamins oral tablet: 1 tab(s) orally once a day  Multivitamin Tablet: 1 tablet orally once a day  ocular lubricant preservative-free ophthalmic solution: 1 drop(s) to each affected eye 3 times a day  OTC Omeprazole Tablet: 1 tablet orally once a day  oxycodone-acetaminophen 5 mg-325 mg oral tablet: 1 tab(s) orally every 4 hours as needed for Moderate Pain (4 - 6) for 5 days  pantoprazole 40 mg oral delayed release tablet: 1 tab(s) orally once a day (before a meal)  polyethylene glycol 3350 oral powder for reconstitution: 17 gram(s) orally 2 times a day  Refresh Eye Drops: 1 to 2 drops instilled in each eye 2 times a day  senna leaf extract oral tablet: 2 tab(s) orally once a day (at bedtime)  traZODone 50 mg oral tablet: 0.5 tab(s) orally once a day (at bedtime)  Tylenol Tablets: 2 tablets orally 2 times a day  valACYclovir 1 g oral tablet: 1 tab(s) orally 3 times a day for 7 days  Vitamin C Tablet: 1 tablet orally once a day  Vitamin D Tablet: 1 tablet orally once a day   acetaminophen 325 mg oral tablet: 2 tab(s) orally every 6 hours As needed Temp greater or equal to 38C (100.4F), Mild Pain (1 - 3)  aluminum hydroxide-magnesium hydroxide 200 mg-200 mg/5 mL oral suspension: 30 milliliter(s) orally every 4 hours As needed Dyspepsia  ascorbic acid 500 mg oral tablet: 1 tab(s) orally once a day  aspirin 81 mg oral tablet: 1 tablet with sensor orally once a day  cholecalciferol oral tablet: 1000 unit(s) orally once a day  clindamycin 1% topical gel: apply topically to affected area 2 times a day  melatonin 5 mg oral tablet: 1 tab(s) orally once a day (at bedtime)  metroNIDAZOLE 0.75% topical cream: apply topically to affected area 2 times a day  Multiple Vitamins oral tablet: 1 tab(s) orally once a day  Multivitamin Tablet: 1 tablet orally once a day  ocular lubricant preservative-free ophthalmic solution: 1 drop(s) to each affected eye 3 times a day  OTC Omeprazole Tablet: 1 tablet orally once a day  oxycodone-acetaminophen 5 mg-325 mg oral tablet: 1 tab(s) orally every 4 hours as needed for Moderate Pain (4 - 6) for 5 days  pantoprazole 40 mg oral delayed release tablet: 1 tab(s) orally once a day (before a meal)  polyethylene glycol 3350 oral powder for reconstitution: 17 gram(s) orally 2 times a day  Refresh Eye Drops: 1 to 2 drops instilled in each eye 2 times a day  senna leaf extract oral tablet: 2 tab(s) orally once a day (at bedtime)  traZODone 50 mg oral tablet: 0.5 tab(s) orally once a day (at bedtime)  Tylenol Tablets: 2 tablets orally 2 times a day  valACYclovir 1 g oral tablet: 1 tab(s) orally 3 times a day for 7 days, end date 2/19  Vitamin C Tablet: 1 tablet orally once a day  Vitamin D Tablet: 1 tablet orally once a day   acetaminophen 325 mg oral tablet: 2 tab(s) orally every 6 hours As needed Temp greater or equal to 38C (100.4F), Mild Pain (1 - 3)  aluminum hydroxide-magnesium hydroxide 200 mg-200 mg/5 mL oral suspension: 30 milliliter(s) orally every 4 hours As needed Dyspepsia  ascorbic acid 500 mg oral tablet: 1 tab(s) orally once a day  aspirin 81 mg oral tablet: 1 tablet with sensor orally once a day  cholecalciferol oral tablet: 1000 unit(s) orally once a day  clindamycin 1% topical gel: apply topically to affected area 2 times a day  melatonin 5 mg oral tablet: 1 tab(s) orally once a day (at bedtime)  metroNIDAZOLE 0.75% topical cream: apply topically to affected area 2 times a day  Multiple Vitamins oral tablet: 1 tab(s) orally once a day  Multivitamin Tablet: 1 tablet orally once a day  ocular lubricant preservative-free ophthalmic solution: 1 drop(s) to each affected eye 3 times a day  OTC Omeprazole Tablet: 1 tablet orally once a day  oxyCODONE 10 mg oral tablet: 1 tab(s) orally every 4 hours As needed Severe Pain (7 - 10)  oxycodone-acetaminophen 5 mg-325 mg oral tablet: 1 tab(s) orally every 4 hours as needed for Moderate Pain (4 - 6) for 5 days  pantoprazole 40 mg oral delayed release tablet: 1 tab(s) orally once a day (before a meal)  polyethylene glycol 3350 oral powder for reconstitution: 17 gram(s) orally 2 times a day  Refresh Eye Drops: 1 to 2 drops instilled in each eye 2 times a day  senna leaf extract oral tablet: 2 tab(s) orally once a day (at bedtime)  traZODone 50 mg oral tablet: 0.5 tab(s) orally once a day (at bedtime)  Tylenol Tablets: 2 tablets orally 2 times a day  Vitamin C Tablet: 1 tablet orally once a day  Vitamin D Tablet: 1 tablet orally once a day   acetaminophen 325 mg oral tablet: 2 tab(s) orally every 6 hours As needed Temp greater or equal to 38C (100.4F), Mild Pain (1 - 3)  aluminum hydroxide-magnesium hydroxide 200 mg-200 mg/5 mL oral suspension: 30 milliliter(s) orally every 4 hours As needed Dyspepsia  ascorbic acid 500 mg oral tablet: 1 tab(s) orally once a day  aspirin 81 mg oral tablet: 1 tablet with sensor orally once a day  camphor-menthol 0.5%-0.5% topical lotion: 1 Apply topically to affected area 2 times a day As needed Itch  cholecalciferol oral tablet: 1000 unit(s) orally once a day  clindamycin 1% topical gel: apply topically to affected area 2 times a day  melatonin 5 mg oral tablet: 1 tab(s) orally once a day (at bedtime)  metroNIDAZOLE 0.75% topical cream: apply topically to affected area 2 times a day  Multiple Vitamins oral tablet: 1 tab(s) orally once a day  Multivitamin Tablet: 1 tablet orally once a day  ocular lubricant preservative-free ophthalmic solution: 1 drop(s) to each affected eye 3 times a day  OTC Omeprazole Tablet: 1 tablet orally once a day  oxyCODONE 10 mg oral tablet: 1 tab(s) orally every 4 hours As needed Severe Pain (7 - 10)  oxycodone-acetaminophen 5 mg-325 mg oral tablet: 1 tab(s) orally every 4 hours as needed for Moderate Pain (4 - 6) for 5 days  pantoprazole 40 mg oral delayed release tablet: 1 tab(s) orally once a day (before a meal)  polyethylene glycol 3350 oral powder for reconstitution: 17 gram(s) orally 2 times a day  Refresh Eye Drops: 1 to 2 drops instilled in each eye 2 times a day  senna leaf extract oral tablet: 2 tab(s) orally once a day (at bedtime)  traZODone 50 mg oral tablet: 0.5 tab(s) orally once a day (at bedtime)  Tylenol Tablets: 2 tablets orally 2 times a day  Vitamin C Tablet: 1 tablet orally once a day  Vitamin D Tablet: 1 tablet orally once a day

## 2025-02-14 NOTE — DISCHARGE NOTE PROVIDER - HOSPITAL COURSE
HPI:  84F pmhx of chronic back pain, and anxiety who presents after several falls at home the most recent of which resulted in an ankle fracture.  She says that she went to see her PCP as her low back pain was getting worse, it was radiating down to the ankle and making her knee less stable resulting in buckling and falling. He suggested she see an outpatient neurosurgeon for evaluation.  She went home and had another mechanical fall due to her knee giving out and came to the ED for severe RLE pain.  She also has been on treatment for shingles.     (13 Feb 2025 17:11)    Hospital Course:  85 yo F with PMH of scoliosis and chronic back who presents for R ankle fracture s/p  R ORIF on 2/18.   CT: showing  Status post splinting of a transverse impacted fracture of the lateral malleolus extending up to the tibiotalar joint. Predominantly vertical nondisplaced fracture of the medial malleolus  extending to the posterior malleolus. Comminuted intra-articular fracture of the fifth metatarsal base which extends through the fifth metatarsal shaft and to the fifth metatarsal head/neck. Comminuted and impacted fractures of the third and fourth metatarsal head neck. s/p R ankle ORIF with podiatry today, 2/18. As per ortho continue with nonweightbearing to right lower extremity, elevated Right lower extremity with 2 pillows. Pain control with Tylenol PRN mild pain, Oxycodone 2.5mg PRN moderate pain and morphine for severe pain.    Patient is medically cleared and stable for discharge to [facility], discussed with Dr. Saenz.   Stable for discharge from podiatry standpoint.       Important Medication Changes and Reason:    Active or Pending Issues Requiring Follow-up:    Advanced Directives:   [ x] Full code  [ ] DNR  [ ] Hospice    Discharge Diagnoses:  right ankle fracture   shingles  chronic back pain        HPI:  84F pmhx of chronic back pain, and anxiety who presents after several falls at home the most recent of which resulted in an ankle fracture.  She says that she went to see her PCP as her low back pain was getting worse, it was radiating down to the ankle and making her knee less stable resulting in buckling and falling. He suggested she see an outpatient neurosurgeon for evaluation.  She went home and had another mechanical fall due to her knee giving out and came to the ED for severe RLE pain.  She also has been on treatment for shingles.     (13 Feb 2025 17:11)    Hospital Course:  83 yo F with PMH of scoliosis and chronic back who presents for R ankle fracture s/p  R ORIF on 2/18.   CT: showing  Status post splinting of a transverse impacted fracture of the lateral malleolus extending up to the tibiotalar joint. Predominantly vertical nondisplaced fracture of the medial malleolus  extending to the posterior malleolus. Comminuted intra-articular fracture of the fifth metatarsal base which extends through the fifth metatarsal shaft and to the fifth metatarsal head/neck. Comminuted and impacted fractures of the third and fourth metatarsal head neck. s/p R ankle ORIF with podiatry on 2/18. As per podiatry continue with nonweightbearing to right lower extremity, elevated Right lower extremity with 2 pillows. Pain control with Tylenol PRN mild pain, Oxycodone 2.5mg PRN moderate pain and morphine for severe pain.    Patient is medically cleared and stable for discharge to [facility], discussed with Dr. Saenz.   Stable for discharge from podiatry standpoint.       Important Medication Changes and Reason: as per med rec    Active or Pending Issues Requiring Follow-up: pcp and podiatry     Advanced Directives:   [ x] Full code  [ ] DNR  [ ] Hospice    Discharge Diagnoses:  right ankle fracture   shingles  chronic back pain        HPI:  84F pmhx of chronic back pain, and anxiety who presents after several falls at home the most recent of which resulted in an ankle fracture.  She says that she went to see her PCP as her low back pain was getting worse, it was radiating down to the ankle and making her knee less stable resulting in buckling and falling. He suggested she see an outpatient neurosurgeon for evaluation.  She went home and had another mechanical fall due to her knee giving out and came to the ED for severe RLE pain.  She also has been on treatment for shingles.     (13 Feb 2025 17:11)    Hospital Course:  85 yo F with PMH of scoliosis and chronic back who presents for R ankle fracture s/p s/p R ankle ORIF and 5th metatarsal ORIF on 2/18.   CT: showing  Status post splinting of a transverse impacted fracture of the lateral malleolus extending up to the tibiotalar joint. Predominantly vertical nondisplaced fracture of the medial malleolus  extending to the posterior malleolus. Comminuted intra-articular fracture of the fifth metatarsal base which extends through the fifth metatarsal shaft and to the fifth metatarsal head/neck. Comminuted and impacted fractures of the third and fourth metatarsal head neck. s/p R ankle ORIF with podiatry on 2/18. As per podiatry continue with nonweightbearing to right lower extremity, elevated Right lower extremity with 2 pillows. Pain control with Tylenol PRN mild pain, Oxycodone 5/10 for mod/severe pain.    Patient is medically cleared and stable for discharge to [facility] [place], discussed with Dr. Saenz.   Stable for discharge from podiatry standpoint.       Important Medication Changes and Reason: as per med rec    Active or Pending Issues Requiring Follow-up: pcp and podiatry     Advanced Directives:   [ x] Full code  [ ] DNR  [ ] Hospice    Discharge Diagnoses:  right ankle fracture   shingles  chronic back pain        HPI:  84F pmhx of chronic back pain, and anxiety who presents after several falls at home the most recent of which resulted in an ankle fracture.  She says that she went to see her PCP as her low back pain was getting worse, it was radiating down to the ankle and making her knee less stable resulting in buckling and falling. He suggested she see an outpatient neurosurgeon for evaluation.  She went home and had another mechanical fall due to her knee giving out and came to the ED for severe RLE pain.  She also has been on treatment for shingles.     (13 Feb 2025 17:11)    Hospital Course:  85 yo F with PMH of scoliosis and chronic back who presents for R ankle fracture s/p fall, now s/p R ankle ORIF and 5th metatarsal ORIF on 2/18.   CT: showing  Status post splinting of a transverse impacted fracture of the lateral malleolus extending up to the tibiotalar joint. Predominantly vertical nondisplaced fracture of the medial malleolus  extending to the posterior malleolus. Comminuted intra-articular fracture of the fifth metatarsal base which extends through the fifth metatarsal shaft and to the fifth metatarsal head/neck. Comminuted and impacted fractures of the third and fourth metatarsal head neck. s/p R ankle ORIF with podiatry on 2/18. As per podiatry continue with nonweightbearing to right lower extremity, elevated Right lower extremity with 2 pillows. Pain control with Tylenol PRN mild pain, Oxycodone 5/10 for mod/severe pain. Pt to f/u with Podiatry upon discharge.  Course complicated by shingles noted on T2 dermatome on the right side. Completed course of Valtrex 1g TID for total 7 days (2/12-2/19).  Additionally w/ chronic back pain. Outpatient MR L spine - L5/SI, L4/5, L2/3, L1/2 disc herniations deforming thecal sac so abutting the proximal nerve roots b/l with b/l neural foraminal extension. Rec outpatient f/u with NSGY.  Patient is medically cleared and stable for discharge to [facility], discussed with Dr. Saenz.   Stable for discharge from podiatry standpoint.     Important Medication Changes and Reason: as per med rec    Active or Pending Issues Requiring Follow-up: pcp and podiatry     Advanced Directives:   [ x] Full code  [ ] DNR  [ ] Hospice    Discharge Diagnoses:  RT ankle fracture   Shingles  chronic back pain        HPI:  84F pmhx of chronic back pain, and anxiety who presents after several falls at home the most recent of which resulted in an ankle fracture.  She says that she went to see her PCP as her low back pain was getting worse, it was radiating down to the ankle and making her knee less stable resulting in buckling and falling. He suggested she see an outpatient neurosurgeon for evaluation.  She went home and had another mechanical fall due to her knee giving out and came to the ED for severe RLE pain.  She also has been on treatment for shingles.     (13 Feb 2025 17:11)    Hospital Course:  85 yo F with PMH of scoliosis and chronic back who presents for R ankle fracture s/p fall, now s/p R ankle ORIF and 5th metatarsal ORIF on 2/18.   CT: showing  Status post splinting of a transverse impacted fracture of the lateral malleolus extending up to the tibiotalar joint. Predominantly vertical nondisplaced fracture of the medial malleolus  extending to the posterior malleolus. Comminuted intra-articular fracture of the fifth metatarsal base which extends through the fifth metatarsal shaft and to the fifth metatarsal head/neck. Comminuted and impacted fractures of the third and fourth metatarsal head neck. s/p R ankle ORIF with podiatry on 2/18. As per podiatry continue with nonweightbearing to right lower extremity, elevated Right lower extremity with 2 pillows. Pain control with Tylenol PRN mild pain, Oxycodone 5/10 for mod/severe pain. Pt to f/u with Podiatry upon discharge.  Course complicated by shingles noted on T2 dermatome on the right side. Completed course of Valtrex 1g TID for total 7 days (2/12-2/19).  Additionally w/ chronic back pain. Outpatient MR L spine - L5/SI, L4/5, L2/3, L1/2 disc herniations deforming thecal sac so abutting the proximal nerve roots b/l with b/l neural foraminal extension. Rec outpatient f/u with NSGY.  Patient is medically cleared and stable for discharge to [facility], discussed with Dr. Saenz.   Stable for discharge from podiatry standpoint.     Important Medication Changes and Reason: as per med rec    Active or Pending Issues Requiring Follow-up: pcp and podiatry     Advanced Directives:   [x] Full code  [ ] DNR  [ ] Hospice    Discharge Diagnoses:  RT ankle fracture   Shingles  chronic back pain HPI:  84F pmhx of chronic back pain, and anxiety who presents after several falls at home the most recent of which resulted in an ankle fracture.  She says that she went to see her PCP as her low back pain was getting worse, it was radiating down to the ankle and making her knee less stable resulting in buckling and falling. He suggested she see an outpatient neurosurgeon for evaluation.  She went home and had another mechanical fall due to her knee giving out and came to the ED for severe RLE pain.  She also has been on treatment for shingles.     (13 Feb 2025 17:11)    Hospital Course:  85 yo F with PMH of scoliosis and chronic back who presents for R ankle fracture s/p fall, now s/p R ankle ORIF and 5th metatarsal ORIF on 2/18.   CT: showing  Status post splinting of a transverse impacted fracture of the lateral malleolus extending up to the tibiotalar joint. Predominantly vertical nondisplaced fracture of the medial malleolus  extending to the posterior malleolus. Comminuted intra-articular fracture of the fifth metatarsal base which extends through the fifth metatarsal shaft and to the fifth metatarsal head/neck. Comminuted and impacted fractures of the third and fourth metatarsal head neck. s/p R ankle ORIF with podiatry on 2/18. As per podiatry continue with nonweightbearing to right lower extremity, elevated Right lower extremity with 2 pillows. Pain control with Tylenol PRN mild pain, Oxycodone 5/10 for mod/severe pain. Pt to f/u with Podiatry upon discharge.  Course complicated by shingles noted on T2 dermatome on the right side. Completed course of Valtrex 1g TID for total 7 days (2/12-2/19).  Additionally w/ chronic back pain. Outpatient MR L spine - L5/SI, L4/5, L2/3, L1/2 disc herniations deforming thecal sac so abutting the proximal nerve roots b/l with b/l neural foraminal extension. Rec outpatient f/u with NSGY.  Patient is medically cleared and stable for discharge to [facility], discussed with Dr. Saenz.   Stable for discharge from podiatry standpoint.     Important Medication Changes and Reason: as per med rec    Active or Pending Issues Requiring Follow-up: pcp and podiatry     Advanced Directives:   [x] Full code  [ ] DNR  [ ] Hospice    Discharge Diagnoses:  RT ankle fracture   Shingles  chronic back pain

## 2025-02-14 NOTE — DISCHARGE NOTE PROVIDER - NSDCFUADDAPPT_GEN_ALL_CORE_FT
Podiatry Discharge Instructions:  Follow up: Please follow up with Dr. Guzman within 1 week of discharge from the hospital, please call [phone] for appointment and discuss that you recently were seen in the hospital.  Wound Care: Please leave your dressing clean dry intact until your follow up appointment  Weight bearing: Please do not weight bear to R LE  Antibiotics: Please continue as instructed. Podiatry Discharge Instructions:  Follow up: Please follow up with Dr. Guzman within 1 week of discharge from the hospital, please call [phone] for appointment and discuss that you recently were seen in the hospital.  Wound Care: Please leave your dressing clean dry intact until your follow up appointment  Weight bearing: Please do not weight bear to the right lower extremity   Antibiotics: Please continue as instructed.

## 2025-02-14 NOTE — DISCHARGE NOTE PROVIDER - CARE PROVIDERS DIRECT ADDRESSES
[email or web address]

## 2025-02-14 NOTE — DISCHARGE NOTE PROVIDER - CARE PROVIDER_API CALL
Ori Bragg  Internal Medicine  [address], [address]  [place] [address]  Phone: [phone]  Fax: [phone]  Follow Up Time: 1 week    Aure Guzman  Podiatric Medicine and Surgery  [address] [place] [address]  Phone: [phone]  Fax: [phone]  Follow Up Time: 1 week

## 2025-02-14 NOTE — DISCHARGE NOTE PROVIDER - NSDCCPCAREPLAN_GEN_ALL_CORE_FT
PRINCIPAL DISCHARGE DIAGNOSIS  Diagnosis: Fracture of 5th metatarsal  Assessment and Plan of Treatment: You have had a right ankle Open reduction of fracture of medial malleolus of ankle with internal fixation with podiatry on 2/18. As per podiatry continue with nonweightbearing to right lower extremity, elevate right lower extremity with 2 pillows.   Please follow up with  in 1 week        SECONDARY DISCHARGE DIAGNOSES  Diagnosis: Chronic back pain  Assessment and Plan of Treatment: outpatient MR L spine - L5/SI, L4/5, L2/3, L1/2 disc herniations deforming thecal sac so abutting the proximal nerve roots b/l with b/l neural foraminal extension  - outpatient f/u with NSGY  continue with Tylenol as needed pain relief.      Diagnosis: Shingles  Assessment and Plan of Treatment: Crusting lesions over approx T2 dermatome on the right side  completed Valtrex 1g TID for total 7 days  (2/12-2/19).       PRINCIPAL DISCHARGE DIAGNOSIS  Diagnosis: Fracture of right ankle  Assessment and Plan of Treatment: You have had a right ankle  anad 5th metatarsal Open reduction of fracture of medial malleolus of ankle with internal fixation with podiatry on 2/18. As per podiatry continue with nonweightbearing to right lower extremity, elevate right lower extremity with 2 pillows.   Please follow up with  in 1 week        SECONDARY DISCHARGE DIAGNOSES  Diagnosis: Fracture of 5th metatarsal  Assessment and Plan of Treatment: You have had a right ankle  anad 5th metatarsal Open reduction of fracture of medial malleolus of ankle with internal fixation with podiatry on 2/18. As per podiatry continue with nonweightbearing to right lower extremity, elevate right lower extremity with 2 pillows.   Please follow up with  in 1 week    Diagnosis: Shingles  Assessment and Plan of Treatment: Crusting lesions over approx T2 dermatome on the right side  completed Valtrex 1g TID for total 7 days  (2/12-2/19).      Diagnosis: Chronic back pain  Assessment and Plan of Treatment: outpatient MR L spine - L5/SI, L4/5, L2/3, L1/2 disc herniations deforming thecal sac so abutting the proximal nerve roots b/l with b/l neural foraminal extension  - outpatient f/u with NSGY and/or ortho-spine  continue with Tylenol as needed

## 2025-02-14 NOTE — PROGRESS NOTE ADULT - NSPROGADDITIONALINFOA_GEN_ALL_CORE
disposition: PAOLA ZARAGOZA pending 3 midnight stays    Saniya Sharma D.O.  Division of Hospital Medicine  Available on MS Teams

## 2025-02-14 NOTE — DISCHARGE NOTE PROVIDER - PROVIDER TOKENS
PROVIDER:[TOKEN:[[id number]],FOLLOWUP:[1 week]],PROVIDER:[TOKEN:[[id number]],FOLLOWUP:[1 week]]

## 2025-02-14 NOTE — PROGRESS NOTE ADULT - ASSESSMENT
87F presents with Right foot and ankle fracture  - Patient seen and evaluated   - Afebrile, Neurovascular status intact  - RLE splint left clean dry and intact, NVS to digits intact  - Right lower extremity xray: acute nondispalced fracture of distal medial and lateral malleoli, Acute mildly displaced and impacted fracture at the fourth metatarsal   head neck junction. Acute nondisplaced fractures at the base of the fifth metatarsal as well as at the fifth metatarsal midshaft and head neck junction.  - RLE CT: 3rd and 4th metatarsal fracture, fifth metatarsal base fracture, medial mal and lateral mal fractures  - Patient to remain nonweightbearing to right lower extremity.   - right lower extremity elevate with 2 pillows   - OTC  pain medication PRN   - PT is stable for d/c from podiatry standpoint. Discussed with pt inpt vs outpt procedure If done inpt, could schedule for Wednesday 2/19. Pt at this time electing to be discharged and follow up outpt to be booked.   - No Podiatric surgical intervention this admission, will book procedure outpt  - Follow up information listed in d/c note provider   - Discussed with attending

## 2025-02-14 NOTE — DISCHARGE NOTE PROVIDER - NSDCHOSPICE_GEN_A_CORE
"[facility]   OB/GYN Clinic     CC: Return OB      Subjective:     Stephanie is a 37 year old  at 29w1d who presents for return OB visit. She reports feeling well. Denies uterine cramping, vaginal bleeding or leaking, dysuria. +fetal movement.       Objective:  /72 (BP Location: Left arm, Patient Position: Chair, Cuff Size: Adult Regular)   Pulse 86   Temp 97.6  F (36.4  C) (Tympanic)   Resp 16   Ht 1.6 m (5' 3\")   Wt 77.1 kg (170 lb)   LMP 2023   BMI 30.11 kg/m       Physical Exam:  Gen: Pleasant, talkative female in no apparent distress   Respiratory: breathing comfortably on room air   Cardiac: Warm and well-perfused.   GI: Abd soft and non-tender, gravid  MSK: Grossly normal movement of all four extremities  Psych: mood and affect bright   Lower extremity: edema not present      See OB flowsheet     Assessment/Plan:   37 year old  at 29w1d who presents for follow-up OB visit.   1) New OB lab; T&S, CBC, HIV, RPR, HepBsAg, Hep B antibody, rubella, GC/Chlam WNL. Plan for 3rd tri lab at 28wks.   2) anti M ab: per MFM not concerning for hemolytic disease in  but need to plan T&C prior to admit  3) level 2 anatomy scan   4) PMH/OBHx problems:   Tobacco use- discussed previously, trying to quit  BRCA2+- noted  Anxiety- in counseling  5) Medication review: no changes, continue prenatal vitamin      Return to clinic q2 weeks.  Labor and FM precautions. Discussed likely IOL at 39w to allow for T&C and given AMA. Pt agreeable    Sophie Kennedy MD  OB/GYN       "
No

## 2025-02-15 PROCEDURE — 99233 SBSQ HOSP IP/OBS HIGH 50: CPT

## 2025-02-15 RX ORDER — OXYCODONE HYDROCHLORIDE 30 MG/1
5 TABLET ORAL EVERY 6 HOURS
Refills: 0 | Status: DISCONTINUED | OUTPATIENT
Start: 2025-02-15 | End: 2025-02-16

## 2025-02-15 RX ORDER — KETOROLAC TROMETHAMINE 30 MG/ML
15 INJECTION, SOLUTION INTRAMUSCULAR; INTRAVENOUS ONCE
Refills: 0 | Status: DISCONTINUED | OUTPATIENT
Start: 2025-02-15 | End: 2025-02-15

## 2025-02-15 RX ORDER — MELATONIN 5 MG
5 TABLET ORAL AT BEDTIME
Refills: 0 | Status: DISCONTINUED | OUTPATIENT
Start: 2025-02-15 | End: 2025-02-20

## 2025-02-15 RX ORDER — KETOROLAC TROMETHAMINE 30 MG/ML
30 INJECTION, SOLUTION INTRAMUSCULAR; INTRAVENOUS ONCE
Refills: 0 | Status: DISCONTINUED | OUTPATIENT
Start: 2025-02-15 | End: 2025-02-15

## 2025-02-15 RX ADMIN — Medication 1 TABLET(S): at 11:04

## 2025-02-15 RX ADMIN — Medication 40 MILLIGRAM(S): at 05:05

## 2025-02-15 RX ADMIN — Medication 500 MILLIGRAM(S): at 11:04

## 2025-02-15 RX ADMIN — Medication 1 DROP(S): at 05:04

## 2025-02-15 RX ADMIN — Medication 81 MILLIGRAM(S): at 11:04

## 2025-02-15 RX ADMIN — Medication 1000 UNIT(S): at 11:04

## 2025-02-15 RX ADMIN — Medication 5 MILLIGRAM(S): at 14:33

## 2025-02-15 RX ADMIN — Medication 1 DROP(S): at 13:27

## 2025-02-15 RX ADMIN — ENOXAPARIN SODIUM 40 MILLIGRAM(S): 100 INJECTION SUBCUTANEOUS at 17:09

## 2025-02-15 RX ADMIN — Medication 650 MILLIGRAM(S): at 08:56

## 2025-02-15 RX ADMIN — Medication 4 MILLIGRAM(S): at 10:15

## 2025-02-15 RX ADMIN — KETOROLAC TROMETHAMINE 15 MILLIGRAM(S): 30 INJECTION, SOLUTION INTRAMUSCULAR; INTRAVENOUS at 10:03

## 2025-02-15 RX ADMIN — OXYCODONE HYDROCHLORIDE 5 MILLIGRAM(S): 30 TABLET ORAL at 22:09

## 2025-02-15 RX ADMIN — OXYCODONE HYDROCHLORIDE 5 MILLIGRAM(S): 30 TABLET ORAL at 02:49

## 2025-02-15 RX ADMIN — OXYCODONE HYDROCHLORIDE 5 MILLIGRAM(S): 30 TABLET ORAL at 21:39

## 2025-02-15 RX ADMIN — Medication 5 MILLIGRAM(S): at 13:27

## 2025-02-15 RX ADMIN — Medication 1000 MILLIGRAM(S): at 13:27

## 2025-02-15 RX ADMIN — KETOROLAC TROMETHAMINE 15 MILLIGRAM(S): 30 INJECTION, SOLUTION INTRAMUSCULAR; INTRAVENOUS at 10:49

## 2025-02-15 RX ADMIN — Medication 1 DROP(S): at 21:40

## 2025-02-15 RX ADMIN — Medication 5 MILLIGRAM(S): at 21:40

## 2025-02-15 RX ADMIN — Medication 1000 MILLIGRAM(S): at 21:40

## 2025-02-15 RX ADMIN — Medication 650 MILLIGRAM(S): at 09:41

## 2025-02-15 RX ADMIN — Medication 1000 MILLIGRAM(S): at 05:04

## 2025-02-15 NOTE — PROGRESS NOTE ADULT - PROBLEM SELECTOR PLAN 2
Crusting lesions over approx T2 dermatome on the right side  Continue Valtrex 1g TID for total 7 days  (2/12-2/19)  start contact isolation for shingles Crusting lesions over approx T2 dermatome on the right side  Continue Valtrex 1g TID for total 7 days  (2/12-2/19)

## 2025-02-15 NOTE — PROGRESS NOTE ADULT - PROBLEM SELECTOR PLAN 3
- home Takes Tylenol PRN pain relief - MR L spine- L5/SI, L4/5, L2/3, L1/2 disc herniations deforming thecal sac so abutiting the proximal nerve roots b/l with b/l neural foraminal extensin  - patient has refused any sort of nsgy intervention  - home Takes Tylenol PRN pain relief

## 2025-02-15 NOTE — PROGRESS NOTE ADULT - ASSESSMENT
87F presents with Right foot and ankle fracture  - Patient seen and evaluated   - Afebrile, Neurovascular status intact  - RLE splint left clean dry and intact, NVS to digits intact  - Right lower extremity xray: acute nondispalced fracture of distal medial and lateral malleoli, Acute mildly displaced and impacted fracture at the fourth metatarsal   head neck junction. Acute nondisplaced fractures at the base of the fifth metatarsal as well as at the fifth metatarsal midshaft and head neck junction.  - RLE CT: 3rd and 4th metatarsal fracture, fifth metatarsal base fracture, medial mal and lateral mal fractures  - Patient to remain nonweightbearing to right lower extremity.   - right lower extremity elevate with 2 pillows   - Discussed surgical vs conservative options at bedside with patient, at this time she is considering surgical intervention for R ankle fracture, will discuss with family this weekend  - Pod Plan: Scheduled for R ankle ORIF on Tuesday at 4pm  - Please document medical clearance for podiatric surgical intervention   - Follow up information listed in d/c note provider   - Seen with attending

## 2025-02-15 NOTE — PROGRESS NOTE ADULT - NSPROGADDITIONALINFOA_GEN_ALL_CORE
disposition: ORIF 2/18    Saniya Sharma D.O.  Division of Hospital Medicine  Available on MS Teams disposition: ORIF 2/18  discussed with both daughters azul and collins and boyfriend at bedside on 2/15    Saniya Sharma D.O.  Division of Hospital Medicine  Available on MS Teams

## 2025-02-16 PROCEDURE — 99233 SBSQ HOSP IP/OBS HIGH 50: CPT

## 2025-02-16 RX ORDER — OXYCODONE HYDROCHLORIDE 30 MG/1
2.5 TABLET ORAL EVERY 6 HOURS
Refills: 0 | Status: DISCONTINUED | OUTPATIENT
Start: 2025-02-16 | End: 2025-02-18

## 2025-02-16 RX ADMIN — Medication 5 MILLIGRAM(S): at 21:19

## 2025-02-16 RX ADMIN — Medication 650 MILLIGRAM(S): at 07:42

## 2025-02-16 RX ADMIN — Medication 1000 MILLIGRAM(S): at 13:34

## 2025-02-16 RX ADMIN — Medication 1 TABLET(S): at 11:51

## 2025-02-16 RX ADMIN — Medication 5 MILLIGRAM(S): at 22:39

## 2025-02-16 RX ADMIN — Medication 1 DROP(S): at 21:13

## 2025-02-16 RX ADMIN — Medication 1000 MILLIGRAM(S): at 05:16

## 2025-02-16 RX ADMIN — Medication 1 DROP(S): at 05:16

## 2025-02-16 RX ADMIN — Medication 1 DROP(S): at 13:34

## 2025-02-16 RX ADMIN — Medication 5 MILLIGRAM(S): at 21:13

## 2025-02-16 RX ADMIN — Medication 650 MILLIGRAM(S): at 09:35

## 2025-02-16 RX ADMIN — Medication 81 MILLIGRAM(S): at 11:51

## 2025-02-16 RX ADMIN — Medication 1000 UNIT(S): at 11:51

## 2025-02-16 RX ADMIN — Medication 40 MILLIGRAM(S): at 05:17

## 2025-02-16 RX ADMIN — Medication 500 MILLIGRAM(S): at 11:51

## 2025-02-16 RX ADMIN — Medication 1000 MILLIGRAM(S): at 21:14

## 2025-02-16 RX ADMIN — ENOXAPARIN SODIUM 40 MILLIGRAM(S): 100 INJECTION SUBCUTANEOUS at 17:07

## 2025-02-16 NOTE — PROGRESS NOTE ADULT - PROBLEM SELECTOR PLAN 2
Crusting lesions over approx T2 dermatome on the right side  Continue Valtrex 1g TID for total 7 days  (2/12-2/19)

## 2025-02-16 NOTE — PROGRESS NOTE ADULT - NSPROGADDITIONALINFOA_GEN_ALL_CORE
disposition: ORIF 2/18  discussed with both daughters OLIMPIA clifford and boyfriend at bedside on 2/15    Saniya Sharma D.O.  Division of Hospital Medicine  Available on MS Teams

## 2025-02-16 NOTE — PROGRESS NOTE ADULT - PROBLEM SELECTOR PLAN 3
- MR L spine- L5/SI, L4/5, L2/3, L1/2 disc herniations deforming thecal sac so abutting the proximal nerve roots b/l with b/l neural foraminal extensin  - patient has refused any sort of nsgy intervention  - home Takes Tylenol PRN pain relief

## 2025-02-17 LAB
ALBUMIN SERPL ELPH-MCNC: 3.8 G/DL — SIGNIFICANT CHANGE UP (ref 3.3–5)
ALP SERPL-CCNC: 58 U/L — SIGNIFICANT CHANGE UP (ref 40–120)
ALT FLD-CCNC: 31 U/L — SIGNIFICANT CHANGE UP (ref 10–45)
ANION GAP SERPL CALC-SCNC: 10 MMOL/L — SIGNIFICANT CHANGE UP (ref 5–17)
APTT BLD: 25 SEC — SIGNIFICANT CHANGE UP (ref 24.5–35.6)
AST SERPL-CCNC: 27 U/L — SIGNIFICANT CHANGE UP (ref 10–40)
BASOPHILS # BLD AUTO: 0.08 K/UL — SIGNIFICANT CHANGE UP (ref 0–0.2)
BASOPHILS NFR BLD AUTO: 1 % — SIGNIFICANT CHANGE UP (ref 0–2)
BILIRUB SERPL-MCNC: 0.6 MG/DL — SIGNIFICANT CHANGE UP (ref 0.2–1.2)
BUN SERPL-MCNC: 15 MG/DL — SIGNIFICANT CHANGE UP (ref 7–23)
CALCIUM SERPL-MCNC: 9.3 MG/DL — SIGNIFICANT CHANGE UP (ref 8.4–10.5)
CHLORIDE SERPL-SCNC: 99 MMOL/L — SIGNIFICANT CHANGE UP (ref 96–108)
CO2 SERPL-SCNC: 27 MMOL/L — SIGNIFICANT CHANGE UP (ref 22–31)
CREAT SERPL-MCNC: 0.74 MG/DL — SIGNIFICANT CHANGE UP (ref 0.5–1.3)
EGFR: 80 ML/MIN/1.73M2 — SIGNIFICANT CHANGE UP
EOSINOPHIL # BLD AUTO: 0.11 K/UL — SIGNIFICANT CHANGE UP (ref 0–0.5)
EOSINOPHIL NFR BLD AUTO: 1.4 % — SIGNIFICANT CHANGE UP (ref 0–6)
GLUCOSE SERPL-MCNC: 104 MG/DL — HIGH (ref 70–99)
HCT VFR BLD CALC: 41.8 % — SIGNIFICANT CHANGE UP (ref 34.5–45)
HGB BLD-MCNC: 13.6 G/DL — SIGNIFICANT CHANGE UP (ref 11.5–15.5)
IMM GRANULOCYTES NFR BLD AUTO: 0.5 % — SIGNIFICANT CHANGE UP (ref 0–0.9)
INR BLD: 1.03 RATIO — SIGNIFICANT CHANGE UP (ref 0.85–1.16)
LYMPHOCYTES # BLD AUTO: 1.46 K/UL — SIGNIFICANT CHANGE UP (ref 1–3.3)
LYMPHOCYTES # BLD AUTO: 18.7 % — SIGNIFICANT CHANGE UP (ref 13–44)
MCHC RBC-ENTMCNC: 30.3 PG — SIGNIFICANT CHANGE UP (ref 27–34)
MCHC RBC-ENTMCNC: 32.5 G/DL — SIGNIFICANT CHANGE UP (ref 32–36)
MCV RBC AUTO: 93.1 FL — SIGNIFICANT CHANGE UP (ref 80–100)
MONOCYTES # BLD AUTO: 0.67 K/UL — SIGNIFICANT CHANGE UP (ref 0–0.9)
MONOCYTES NFR BLD AUTO: 8.6 % — SIGNIFICANT CHANGE UP (ref 2–14)
NEUTROPHILS # BLD AUTO: 5.44 K/UL — SIGNIFICANT CHANGE UP (ref 1.8–7.4)
NEUTROPHILS NFR BLD AUTO: 69.8 % — SIGNIFICANT CHANGE UP (ref 43–77)
NRBC BLD AUTO-RTO: 0 /100 WBCS — SIGNIFICANT CHANGE UP (ref 0–0)
PLATELET # BLD AUTO: 236 K/UL — SIGNIFICANT CHANGE UP (ref 150–400)
POTASSIUM SERPL-MCNC: 4.3 MMOL/L — SIGNIFICANT CHANGE UP (ref 3.5–5.3)
POTASSIUM SERPL-SCNC: 4.3 MMOL/L — SIGNIFICANT CHANGE UP (ref 3.5–5.3)
PROT SERPL-MCNC: 6.7 G/DL — SIGNIFICANT CHANGE UP (ref 6–8.3)
PROTHROM AB SERPL-ACNC: 11.8 SEC — SIGNIFICANT CHANGE UP (ref 9.9–13.4)
RBC # BLD: 4.49 M/UL — SIGNIFICANT CHANGE UP (ref 3.8–5.2)
RBC # FLD: 13.5 % — SIGNIFICANT CHANGE UP (ref 10.3–14.5)
SODIUM SERPL-SCNC: 136 MMOL/L — SIGNIFICANT CHANGE UP (ref 135–145)
WBC # BLD: 7.8 K/UL — SIGNIFICANT CHANGE UP (ref 3.8–10.5)
WBC # FLD AUTO: 7.8 K/UL — SIGNIFICANT CHANGE UP (ref 3.8–10.5)

## 2025-02-17 PROCEDURE — 99233 SBSQ HOSP IP/OBS HIGH 50: CPT

## 2025-02-17 PROCEDURE — 71045 X-RAY EXAM CHEST 1 VIEW: CPT | Mod: 26

## 2025-02-17 RX ORDER — POLYETHYLENE GLYCOL 3350 17 G/17G
17 POWDER, FOR SOLUTION ORAL DAILY
Refills: 0 | Status: DISCONTINUED | OUTPATIENT
Start: 2025-02-17 | End: 2025-02-19

## 2025-02-17 RX ORDER — SENNA 187 MG
2 TABLET ORAL AT BEDTIME
Refills: 0 | Status: DISCONTINUED | OUTPATIENT
Start: 2025-02-17 | End: 2025-02-20

## 2025-02-17 RX ADMIN — Medication 1000 MILLIGRAM(S): at 06:08

## 2025-02-17 RX ADMIN — Medication 5 MILLIGRAM(S): at 04:21

## 2025-02-17 RX ADMIN — ENOXAPARIN SODIUM 40 MILLIGRAM(S): 100 INJECTION SUBCUTANEOUS at 17:55

## 2025-02-17 RX ADMIN — Medication 5 MILLIGRAM(S): at 12:16

## 2025-02-17 RX ADMIN — Medication 5 MILLIGRAM(S): at 21:30

## 2025-02-17 RX ADMIN — Medication 1000 UNIT(S): at 11:12

## 2025-02-17 RX ADMIN — Medication 1 TABLET(S): at 11:12

## 2025-02-17 RX ADMIN — Medication 500 MILLIGRAM(S): at 11:12

## 2025-02-17 RX ADMIN — Medication 30 MILLILITER(S): at 03:26

## 2025-02-17 RX ADMIN — Medication 5 MILLIGRAM(S): at 03:21

## 2025-02-17 RX ADMIN — POLYETHYLENE GLYCOL 3350 17 GRAM(S): 17 POWDER, FOR SOLUTION ORAL at 17:55

## 2025-02-17 RX ADMIN — Medication 1 DROP(S): at 14:24

## 2025-02-17 RX ADMIN — Medication 5 MILLIGRAM(S): at 11:12

## 2025-02-17 RX ADMIN — Medication 1 DROP(S): at 21:30

## 2025-02-17 RX ADMIN — Medication 40 MILLIGRAM(S): at 06:08

## 2025-02-17 RX ADMIN — Medication 1000 MILLIGRAM(S): at 14:24

## 2025-02-17 RX ADMIN — Medication 2 TABLET(S): at 21:29

## 2025-02-17 RX ADMIN — Medication 81 MILLIGRAM(S): at 11:12

## 2025-02-17 RX ADMIN — Medication 1 DROP(S): at 06:08

## 2025-02-17 RX ADMIN — Medication 1000 MILLIGRAM(S): at 21:30

## 2025-02-17 NOTE — PROGRESS NOTE ADULT - ASSESSMENT
87F presents with Right foot and ankle fracture  - Patient seen and evaluated   - Afebrile, Neurovascular status intact  - pain on palpation of right foot and ankle no open wounds, no blisters, no tenting, diffuse edema and ecchymosis of foot and ankle.   - Right lower extremity xray: acute nondispalced fracture of distal medial and lateral malleoli, Acute mildly displaced and impacted fracture at the fourth metatarsal   head neck junction. Acute nondisplaced fractures at the base of the fifth metatarsal as well as at the fifth metatarsal midshaft and head neck junction.  - RLE CT: 3rd and 4th metatarsal fracture, fifth metatarsal base fracture, medial mal and lateral mal fractures  - Patient to remain nonweightbearing to right lower extremity.   - R lower extremity elevate with 2 pillows   - Pod Plan: Scheduled for R ankle ORIF on Tuesday at 4pm  - Medical clearance documented in chart, appreciated   - NPO midnight   - CBC BMP Type and Screen Coags in AM  - Discussed with attending

## 2025-02-18 ENCOUNTER — RX RENEWAL (OUTPATIENT)
Age: 85
End: 2025-02-18

## 2025-02-18 LAB
ANION GAP SERPL CALC-SCNC: 11 MMOL/L — SIGNIFICANT CHANGE UP (ref 5–17)
APTT BLD: 27.9 SEC — SIGNIFICANT CHANGE UP (ref 24.5–35.6)
BLD GP AB SCN SERPL QL: NEGATIVE — SIGNIFICANT CHANGE UP
BUN SERPL-MCNC: 13 MG/DL — SIGNIFICANT CHANGE UP (ref 7–23)
CALCIUM SERPL-MCNC: 9.5 MG/DL — SIGNIFICANT CHANGE UP (ref 8.4–10.5)
CHLORIDE SERPL-SCNC: 101 MMOL/L — SIGNIFICANT CHANGE UP (ref 96–108)
CO2 SERPL-SCNC: 23 MMOL/L — SIGNIFICANT CHANGE UP (ref 22–31)
CREAT SERPL-MCNC: 0.63 MG/DL — SIGNIFICANT CHANGE UP (ref 0.5–1.3)
EGFR: 87 ML/MIN/1.73M2 — SIGNIFICANT CHANGE UP
GLUCOSE SERPL-MCNC: 114 MG/DL — HIGH (ref 70–99)
HCT VFR BLD CALC: 41.2 % — SIGNIFICANT CHANGE UP (ref 34.5–45)
HGB BLD-MCNC: 13.9 G/DL — SIGNIFICANT CHANGE UP (ref 11.5–15.5)
INR BLD: 1.03 RATIO — SIGNIFICANT CHANGE UP (ref 0.85–1.16)
MCHC RBC-ENTMCNC: 30.7 PG — SIGNIFICANT CHANGE UP (ref 27–34)
MCHC RBC-ENTMCNC: 33.7 G/DL — SIGNIFICANT CHANGE UP (ref 32–36)
MCV RBC AUTO: 90.9 FL — SIGNIFICANT CHANGE UP (ref 80–100)
NRBC BLD AUTO-RTO: 0 /100 WBCS — SIGNIFICANT CHANGE UP (ref 0–0)
PLATELET # BLD AUTO: 249 K/UL — SIGNIFICANT CHANGE UP (ref 150–400)
POTASSIUM SERPL-MCNC: 4.1 MMOL/L — SIGNIFICANT CHANGE UP (ref 3.5–5.3)
POTASSIUM SERPL-SCNC: 4.1 MMOL/L — SIGNIFICANT CHANGE UP (ref 3.5–5.3)
PROTHROM AB SERPL-ACNC: 11.8 SEC — SIGNIFICANT CHANGE UP (ref 9.9–13.4)
RBC # BLD: 4.53 M/UL — SIGNIFICANT CHANGE UP (ref 3.8–5.2)
RBC # FLD: 13.5 % — SIGNIFICANT CHANGE UP (ref 10.3–14.5)
RH IG SCN BLD-IMP: POSITIVE — SIGNIFICANT CHANGE UP
SODIUM SERPL-SCNC: 135 MMOL/L — SIGNIFICANT CHANGE UP (ref 135–145)
WBC # BLD: 7.57 K/UL — SIGNIFICANT CHANGE UP (ref 3.8–10.5)
WBC # FLD AUTO: 7.57 K/UL — SIGNIFICANT CHANGE UP (ref 3.8–10.5)

## 2025-02-18 PROCEDURE — 99232 SBSQ HOSP IP/OBS MODERATE 35: CPT

## 2025-02-18 PROCEDURE — 73610 X-RAY EXAM OF ANKLE: CPT | Mod: 26,RT

## 2025-02-18 PROCEDURE — 73630 X-RAY EXAM OF FOOT: CPT | Mod: 26,RT

## 2025-02-18 DEVICE — IMPLANTABLE DEVICE: Type: IMPLANTABLE DEVICE | Site: RIGHT | Status: FUNCTIONAL

## 2025-02-18 DEVICE — GUIDEWIRE UNTHREADED 1.4X150MM: Type: IMPLANTABLE DEVICE | Site: RIGHT | Status: FUNCTIONAL

## 2025-02-18 DEVICE — SCREW NON-LOCKG 3.5X28: Type: IMPLANTABLE DEVICE | Site: RIGHT | Status: FUNCTIONAL

## 2025-02-18 DEVICE — SCREW 3.5X34MM: Type: IMPLANTABLE DEVICE | Site: RIGHT | Status: FUNCTIONAL

## 2025-02-18 DEVICE — SCREW 3.5X32: Type: IMPLANTABLE DEVICE | Site: RIGHT | Status: FUNCTIONAL

## 2025-02-18 DEVICE — PIN FIXATION ANCHORAGE: Type: IMPLANTABLE DEVICE | Site: RIGHT | Status: FUNCTIONAL

## 2025-02-18 RX ORDER — ONDANSETRON HCL/PF 4 MG/2 ML
4 VIAL (ML) INJECTION ONCE
Refills: 0 | Status: DISCONTINUED | OUTPATIENT
Start: 2025-02-18 | End: 2025-02-18

## 2025-02-18 RX ORDER — HYDROMORPHONE/SOD CHLOR,ISO/PF 2 MG/10 ML
0.25 SYRINGE (ML) INJECTION
Refills: 0 | Status: DISCONTINUED | OUTPATIENT
Start: 2025-02-18 | End: 2025-02-18

## 2025-02-18 RX ORDER — ACETAMINOPHEN 500 MG/5ML
650 LIQUID (ML) ORAL EVERY 6 HOURS
Refills: 0 | Status: DISCONTINUED | OUTPATIENT
Start: 2025-02-18 | End: 2025-02-18

## 2025-02-18 RX ADMIN — Medication 2 TABLET(S): at 22:40

## 2025-02-18 RX ADMIN — Medication 40 MILLIGRAM(S): at 05:26

## 2025-02-18 RX ADMIN — Medication 1 DROP(S): at 14:03

## 2025-02-18 RX ADMIN — Medication 1 DROP(S): at 05:26

## 2025-02-18 RX ADMIN — Medication 1000 MILLIGRAM(S): at 22:40

## 2025-02-18 RX ADMIN — Medication 1000 MILLIGRAM(S): at 05:27

## 2025-02-18 RX ADMIN — Medication 5 MILLIGRAM(S): at 22:40

## 2025-02-18 RX ADMIN — Medication 1 DROP(S): at 22:40

## 2025-02-18 RX ADMIN — Medication 1000 MILLIGRAM(S): at 14:02

## 2025-02-18 NOTE — PROGRESS NOTE ADULT - PROBLEM SELECTOR PLAN 3
- outpatient MR L spine - L5/SI, L4/5, L2/3, L1/2 disc herniations deforming thecal sac so abutting the proximal nerve roots b/l with b/l neural foraminal extension  - outpatient f/u with NSGY  - home Takes Tylenol PRN pain relief

## 2025-02-18 NOTE — BRIEF OPERATIVE NOTE - COMMENTS
s/p right ankle ORIF and 5th met ORIF  - incisions closed with 4-0 nylon   - AO splint applied   - Pt stable for discharge after seen by PT tomorrow   - Recommend Lovenox protocol on d/c

## 2025-02-18 NOTE — PROGRESS NOTE ADULT - ASSESSMENT
83 yo F with PMH of scoliosis and chronic back who presents for R ankle fracture pending R ORIF won 2/18.

## 2025-02-18 NOTE — PRE-ANESTHESIA EVALUATION ADULT - NSANTHPMHFT_GEN_ALL_CORE
chart and consultant notes reviewed. no hx sig cv/pulm dz - previously ambulated without cp/sob. ekg sr. no signs active cad/chf. prior cea

## 2025-02-18 NOTE — BRIEF OPERATIVE NOTE - OPERATION/FINDINGS
s/p Right ankle ORIF with 5th met ORIF   - Adequate reduction and internal fixation of ankle fracture and 5th met using plate over medial mal and 3.0 screw to 5th met   - minimal blood loss (5cc)

## 2025-02-18 NOTE — PROGRESS NOTE ADULT - PROBLEM SELECTOR PLAN 2
Crusting lesions over approx T2 dermatome on the right side  - Continue Valtrex 1g TID for total 7 days  (2/12-2/19)

## 2025-02-18 NOTE — PROGRESS NOTE ADULT - NSPROGADDITIONALINFOA_GEN_ALL_CORE
.  Alicia Saenz MD  Division of Hospital Medicine  [facility]   Available on [employer] Teams - messages preferred prior to calls.    Plan discussed with patient, daughter and boyfriend bedside, and medicine VAHID Juarez.

## 2025-02-18 NOTE — BRIEF OPERATIVE NOTE - NSICDXBRIEFPROCEDURE_GEN_ALL_CORE_FT
PROCEDURES:  Open reduction of fracture of medial malleolus of ankle with internal fixation if indicated 18-Feb-2025 18:54:07  Yi Callahan  ORIF, fracture, metatarsal, fifth 18-Feb-2025 18:54:23  Yi Callahan

## 2025-02-19 LAB
ANION GAP SERPL CALC-SCNC: 15 MMOL/L — SIGNIFICANT CHANGE UP (ref 5–17)
BUN SERPL-MCNC: 19 MG/DL — SIGNIFICANT CHANGE UP (ref 7–23)
CALCIUM SERPL-MCNC: 8.9 MG/DL — SIGNIFICANT CHANGE UP (ref 8.4–10.5)
CHLORIDE SERPL-SCNC: 99 MMOL/L — SIGNIFICANT CHANGE UP (ref 96–108)
CO2 SERPL-SCNC: 21 MMOL/L — LOW (ref 22–31)
CREAT SERPL-MCNC: 0.71 MG/DL — SIGNIFICANT CHANGE UP (ref 0.5–1.3)
EGFR: 84 ML/MIN/1.73M2 — SIGNIFICANT CHANGE UP
GLUCOSE SERPL-MCNC: 113 MG/DL — HIGH (ref 70–99)
HCT VFR BLD CALC: 40.8 % — SIGNIFICANT CHANGE UP (ref 34.5–45)
HGB BLD-MCNC: 13.4 G/DL — SIGNIFICANT CHANGE UP (ref 11.5–15.5)
MCHC RBC-ENTMCNC: 30.5 PG — SIGNIFICANT CHANGE UP (ref 27–34)
MCHC RBC-ENTMCNC: 32.8 G/DL — SIGNIFICANT CHANGE UP (ref 32–36)
MCV RBC AUTO: 92.7 FL — SIGNIFICANT CHANGE UP (ref 80–100)
NRBC BLD AUTO-RTO: 0 /100 WBCS — SIGNIFICANT CHANGE UP (ref 0–0)
PLATELET # BLD AUTO: 268 K/UL — SIGNIFICANT CHANGE UP (ref 150–400)
POTASSIUM SERPL-MCNC: 4.1 MMOL/L — SIGNIFICANT CHANGE UP (ref 3.5–5.3)
POTASSIUM SERPL-SCNC: 4.1 MMOL/L — SIGNIFICANT CHANGE UP (ref 3.5–5.3)
RBC # BLD: 4.4 M/UL — SIGNIFICANT CHANGE UP (ref 3.8–5.2)
RBC # FLD: 13.5 % — SIGNIFICANT CHANGE UP (ref 10.3–14.5)
SODIUM SERPL-SCNC: 135 MMOL/L — SIGNIFICANT CHANGE UP (ref 135–145)
WBC # BLD: 8.97 K/UL — SIGNIFICANT CHANGE UP (ref 3.8–10.5)
WBC # FLD AUTO: 8.97 K/UL — SIGNIFICANT CHANGE UP (ref 3.8–10.5)

## 2025-02-19 PROCEDURE — 99232 SBSQ HOSP IP/OBS MODERATE 35: CPT

## 2025-02-19 RX ORDER — POLYETHYLENE GLYCOL 3350 17 G/17G
17 POWDER, FOR SOLUTION ORAL
Refills: 0 | Status: DISCONTINUED | OUTPATIENT
Start: 2025-02-19 | End: 2025-02-20

## 2025-02-19 RX ORDER — OXYCODONE HYDROCHLORIDE AND ACETAMINOPHEN 10; 325 MG/1; MG/1
1 TABLET ORAL
Qty: 0 | Refills: 0 | DISCHARGE
Start: 2025-02-19

## 2025-02-19 RX ORDER — OXYCODONE HYDROCHLORIDE 30 MG/1
10 TABLET ORAL EVERY 4 HOURS
Refills: 0 | Status: DISCONTINUED | OUTPATIENT
Start: 2025-02-19 | End: 2025-02-20

## 2025-02-19 RX ORDER — BISACODYL 5 MG
10 TABLET, DELAYED RELEASE (ENTERIC COATED) ORAL ONCE
Refills: 0 | Status: COMPLETED | OUTPATIENT
Start: 2025-02-19 | End: 2025-02-19

## 2025-02-19 RX ORDER — B1/B2/B3/B5/B6/B12/VIT C/FOLIC 500-0.5 MG
1 TABLET ORAL
Qty: 0 | Refills: 0 | DISCHARGE
Start: 2025-02-19

## 2025-02-19 RX ORDER — MELATONIN 5 MG
1 TABLET ORAL
Qty: 0 | Refills: 0 | DISCHARGE
Start: 2025-02-19

## 2025-02-19 RX ORDER — SENNA 187 MG
2 TABLET ORAL
Qty: 0 | Refills: 0 | DISCHARGE
Start: 2025-02-19

## 2025-02-19 RX ORDER — POLYETHYLENE GLYCOL 3350 17 G/17G
17 POWDER, FOR SOLUTION ORAL
Qty: 0 | Refills: 0 | DISCHARGE
Start: 2025-02-19

## 2025-02-19 RX ORDER — HYPROMELLOSE 0.4 %
1 DROPS OPHTHALMIC (EYE)
Qty: 0 | Refills: 0 | DISCHARGE
Start: 2025-02-19

## 2025-02-19 RX ORDER — MAGNESIUM, ALUMINUM HYDROXIDE 200-200 MG
30 TABLET,CHEWABLE ORAL
Qty: 0 | Refills: 0 | DISCHARGE
Start: 2025-02-19

## 2025-02-19 RX ORDER — ACETAMINOPHEN 500 MG/5ML
2 LIQUID (ML) ORAL
Qty: 0 | Refills: 0 | DISCHARGE
Start: 2025-02-19

## 2025-02-19 RX ADMIN — Medication 40 MILLIGRAM(S): at 05:24

## 2025-02-19 RX ADMIN — Medication 1000 MILLIGRAM(S): at 05:35

## 2025-02-19 RX ADMIN — Medication 2 TABLET(S): at 21:02

## 2025-02-19 RX ADMIN — Medication 1 TABLET(S): at 12:56

## 2025-02-19 RX ADMIN — Medication 1000 MILLIGRAM(S): at 16:03

## 2025-02-19 RX ADMIN — Medication 1000 MILLIGRAM(S): at 21:02

## 2025-02-19 RX ADMIN — Medication 1 DROP(S): at 21:02

## 2025-02-19 RX ADMIN — ENOXAPARIN SODIUM 40 MILLIGRAM(S): 100 INJECTION SUBCUTANEOUS at 18:31

## 2025-02-19 RX ADMIN — Medication 500 MILLIGRAM(S): at 12:56

## 2025-02-19 RX ADMIN — Medication 5 MILLIGRAM(S): at 21:02

## 2025-02-19 RX ADMIN — POLYETHYLENE GLYCOL 3350 17 GRAM(S): 17 POWDER, FOR SOLUTION ORAL at 18:33

## 2025-02-19 RX ADMIN — Medication 10 MILLIGRAM(S): at 09:11

## 2025-02-19 RX ADMIN — Medication 1 DROP(S): at 05:24

## 2025-02-19 RX ADMIN — Medication 81 MILLIGRAM(S): at 12:56

## 2025-02-19 RX ADMIN — Medication 1 DROP(S): at 16:03

## 2025-02-19 RX ADMIN — Medication 1000 UNIT(S): at 12:56

## 2025-02-19 NOTE — PROGRESS NOTE ADULT - ASSESSMENT
85 yo F with PMH of scoliosis and chronic back who presents for R ankle fracture now s/p R ankle ORIF and 5th met ORIF on 2/18.

## 2025-02-19 NOTE — PROGRESS NOTE ADULT - PROBLEM SELECTOR PLAN 2
Crusting lesions over approx T2 dermatome on the right side  - Treated Valtrex 1g TID for total 7 days (2/12-2/19)

## 2025-02-19 NOTE — PROGRESS NOTE ADULT - NSPROGADDITIONALINFOA_GEN_ALL_CORE
.  Alicia Saenz MD  Division of Hospital Medicine  [facility]   Available on [employer] Teams - messages preferred prior to calls.    Medically clear for discharge to [facility] pending bed.    Plan discussed with patient, boyfriend bedside, and medicine VAHID Juarez.

## 2025-02-19 NOTE — PROGRESS NOTE ADULT - TIME BILLING
- Ordering, reviewing, and interpreting labs, testing, and imaging.  - Independently obtaining a review of systems and performing a physical exam  - Reviewing consultant documentation/recommendations.  - Counselling and educating patient regarding interpretation of aforementioned items and plan of care.
- Ordering, reviewing, and interpreting labs, testing, and imaging.  - Independently obtaining a review of systems and performing a physical exam  - Reviewing consultant documentation/recommendations.  - Counselling and educating patient regarding interpretation of aforementioned items and plan of care.
plan of care, chart review

## 2025-02-19 NOTE — PROGRESS NOTE ADULT - ASSESSMENT
87F 2/18 s/p Right foot and ankle fracture ORIF  - Patient seen and evaluated   - Afebrile, Neurovascular status intact, numbness 2/2 to residual nerve block  - 2/18 s/p R ankle ORIF and 5th metatarsal ORIF, splint left clean dry and intact  - Patient to remain nonweightbearing to right lower extremity.   - R lower extremity elevate with 2 pillows   - Stable for discharge from podiatry standpoint   - Follow up info in discharge note provider under follow up   - Discussed with attending 87F 2/18 s/p Right foot and ankle fracture ORIF  - Patient seen and evaluated   - Afebrile, Neurovascular status intact, numbness 2/2 to residual nerve block  - 2/18 s/p R ankle ORIF and 5th metatarsal ORIF, splint left clean dry and intact  - Patient to remain nonweightbearing to right lower extremity.   - family rec neuro consult for back/nerve pain  - R lower extremity elevate with 2 pillows   - Stable for discharge from podiatry standpoint   - Follow up info in discharge note provider under follow up   - Discussed with attending

## 2025-02-20 ENCOUNTER — TRANSCRIPTION ENCOUNTER (OUTPATIENT)
Age: 85
End: 2025-02-20

## 2025-02-20 VITALS
HEART RATE: 75 BPM | OXYGEN SATURATION: 96 % | DIASTOLIC BLOOD PRESSURE: 69 MMHG | RESPIRATION RATE: 18 BRPM | TEMPERATURE: 98 F | SYSTOLIC BLOOD PRESSURE: 119 MMHG

## 2025-02-20 PROCEDURE — 96374 THER/PROPH/DIAG INJ IV PUSH: CPT

## 2025-02-20 PROCEDURE — 99285 EMERGENCY DEPT VISIT HI MDM: CPT

## 2025-02-20 PROCEDURE — 85730 THROMBOPLASTIN TIME PARTIAL: CPT

## 2025-02-20 PROCEDURE — 73590 X-RAY EXAM OF LOWER LEG: CPT

## 2025-02-20 PROCEDURE — 73630 X-RAY EXAM OF FOOT: CPT

## 2025-02-20 PROCEDURE — 36415 COLL VENOUS BLD VENIPUNCTURE: CPT

## 2025-02-20 PROCEDURE — 71045 X-RAY EXAM CHEST 1 VIEW: CPT

## 2025-02-20 PROCEDURE — 73610 X-RAY EXAM OF ANKLE: CPT

## 2025-02-20 PROCEDURE — C1889: CPT

## 2025-02-20 PROCEDURE — 85610 PROTHROMBIN TIME: CPT

## 2025-02-20 PROCEDURE — 97110 THERAPEUTIC EXERCISES: CPT

## 2025-02-20 PROCEDURE — 81001 URINALYSIS AUTO W/SCOPE: CPT

## 2025-02-20 PROCEDURE — 97116 GAIT TRAINING THERAPY: CPT

## 2025-02-20 PROCEDURE — 86850 RBC ANTIBODY SCREEN: CPT

## 2025-02-20 PROCEDURE — 86901 BLOOD TYPING SEROLOGIC RH(D): CPT

## 2025-02-20 PROCEDURE — C1713: CPT

## 2025-02-20 PROCEDURE — 85027 COMPLETE CBC AUTOMATED: CPT

## 2025-02-20 PROCEDURE — 99239 HOSP IP/OBS DSCHRG MGMT >30: CPT

## 2025-02-20 PROCEDURE — 73700 CT LOWER EXTREMITY W/O DYE: CPT | Mod: MC

## 2025-02-20 PROCEDURE — C1769: CPT

## 2025-02-20 PROCEDURE — 86900 BLOOD TYPING SEROLOGIC ABO: CPT

## 2025-02-20 PROCEDURE — 80053 COMPREHEN METABOLIC PANEL: CPT

## 2025-02-20 PROCEDURE — 99261: CPT

## 2025-02-20 PROCEDURE — 76377 3D RENDER W/INTRP POSTPROCES: CPT

## 2025-02-20 PROCEDURE — 80048 BASIC METABOLIC PNL TOTAL CA: CPT

## 2025-02-20 PROCEDURE — 76000 FLUOROSCOPY <1 HR PHYS/QHP: CPT

## 2025-02-20 PROCEDURE — 85025 COMPLETE CBC W/AUTO DIFF WBC: CPT

## 2025-02-20 PROCEDURE — 97161 PT EVAL LOW COMPLEX 20 MIN: CPT

## 2025-02-20 RX ORDER — OXYCODONE HYDROCHLORIDE 30 MG/1
1 TABLET ORAL
Qty: 0 | Refills: 0 | DISCHARGE
Start: 2025-02-20

## 2025-02-20 RX ADMIN — Medication 500 MILLIGRAM(S): at 12:22

## 2025-02-20 RX ADMIN — POLYETHYLENE GLYCOL 3350 17 GRAM(S): 17 POWDER, FOR SOLUTION ORAL at 19:01

## 2025-02-20 RX ADMIN — Medication 81 MILLIGRAM(S): at 12:22

## 2025-02-20 RX ADMIN — Medication 40 MILLIGRAM(S): at 05:16

## 2025-02-20 RX ADMIN — ENOXAPARIN SODIUM 40 MILLIGRAM(S): 100 INJECTION SUBCUTANEOUS at 19:01

## 2025-02-20 RX ADMIN — Medication 1000 UNIT(S): at 12:22

## 2025-02-20 RX ADMIN — Medication 1 TABLET(S): at 12:22

## 2025-02-20 RX ADMIN — Medication 1 DROP(S): at 15:41

## 2025-02-20 RX ADMIN — Medication 1000 MILLIGRAM(S): at 15:40

## 2025-02-20 RX ADMIN — Medication 1000 MILLIGRAM(S): at 05:16

## 2025-02-20 NOTE — PROGRESS NOTE ADULT - PROBLEM SELECTOR PROBLEM 3
Chronic back pain

## 2025-02-20 NOTE — DISCHARGE NOTE NURSING/CASE MANAGEMENT/SOCIAL WORK - PATIENT PORTAL LINK FT
You can access the FollowMyHealth Patient Portal offered by [facility] by registering at the following website: [email or web address]. By joining [facility]’s FollowMyHealth portal, you will also be able to view your health information using other applications (apps) compatible with our system.

## 2025-02-20 NOTE — PROGRESS NOTE ADULT - PROBLEM SELECTOR PLAN 1
- plan for ORIF 2/18  - patient is an intermediate risk for an intermediate risk procedure  - RCRI: 0 points, 3.9% risk of major cardiac event  CT: showing  Status post splinting of a transverse impacted fracture of the   lateral malleolus extending up to the tibiotalar joint. Predominantly vertical nondisplaced fracture of the medial malleolus  extending to the posterior malleolus   Comminuted intra-articular fracture of the fifth metatarsal base which extends through the fifth metatarsal shaft and to the fifth metatarsal head/neck.    Comminuted and impacted fractures of the third and fourth metatarsal head neck.  - Splint applied by Podiatry Dressing to remain clean, dry, intact   - Patient to remain nonweightbearing to right lower extremity   - Recommend elevation of right lower extremity with 2 pillows    -Tylenol PRN mild pain, Oxycodone 5mg PRN moderate pain and morphine for severe pain
- plan for ORIF 2/18  - patient is an intermediate risk for an intermediate risk procedure  - RCRI: 0 points, 3.9% risk of major cardiac event  CT: showing  Status post splinting of a transverse impacted fracture of the   lateral malleolus extending up to the tibiotalar joint. Predominantly vertical nondisplaced fracture of the medial malleolus  extending to the posterior malleolus   Comminuted intra-articular fracture of the fifth metatarsal base which extends through the fifth metatarsal shaft and to the fifth metatarsal head/neck.    Comminuted and impacted fractures of the third and fourth metatarsal head neck.  - Splint applied by Podiatry Dressing to remain clean, dry, intact   - Patient to remain nonweightbearing to right lower extremity   - Recommend elevation of right lower extremity with 2 pillows    -Tylenol PRN mild pain, Oxycodone 5mg PRN moderate pain and morphine for severe pain
- CT: showing  Status post splinting of a transverse impacted fracture of the lateral malleolus extending up to the tibiotalar joint. Predominantly vertical nondisplaced fracture of the medial malleolus  extending to the posterior malleolus. Comminuted intra-articular fracture of the fifth metatarsal base which extends through the fifth metatarsal shaft and to the fifth metatarsal head/neck. Comminuted and impacted fractures of the third and fourth metatarsal head neck  - s/p R ankle ORIF and 5th met ORIF on 2/18  - NWB to RLE, elevated RLE with 2 pillows  - pain control with Tylenol PRN mild pain, percocet 5/325 mg PRN moderate pain and oxy 10mg PRN severe pain
- CT: showing  Status post splinting of a transverse impacted fracture of the lateral malleolus extending up to the tibiotalar joint. Predominantly vertical nondisplaced fracture of the medial malleolus  extending to the posterior malleolus. Comminuted intra-articular fracture of the fifth metatarsal base which extends through the fifth metatarsal shaft and to the fifth metatarsal head/neck. Comminuted and impacted fractures of the third and fourth metatarsal head neck  - s/p R ankle ORIF and 5th met ORIF on 2/18  - NWB to RLE, elevated RLE with 2 pillows  - pain control with Tylenol PRN mild pain, percocet 5/325 mg PRN moderate pain and oxy 10mg PRN severe pain
CT: showing  Status post splinting of a transverse impacted fracture of the   lateral malleolus extending up to the tibiotalar joint. Predominantly vertical nondisplaced fracture of the medial malleolus  extending to the posterior malleolus   Comminuted intra-articular fracture of the fifth metatarsal base which extends through the fifth metatarsal shaft and to the fifth metatarsal head/neck.    Comminuted and impacted fractures of the third and fourth metatarsal head neck.  - Splint applied by Podiatry Dressing to remain clean, dry, intact   - Patient to remain nonweightbearing to right lower extremity   - Recommend elevation of right lower extremity with 2 pillows    - Patient to follow up within 1 week with Dr. Guzman ([phone])   -Tylenol PRN mild pain, Oxycodone 5mg PRN severe pain
- plan for ORIF 2/18  - patient is an intermediate risk for an intermediate risk procedure  - RCRI: 0 points, 3.9% risk of major cardiac event  CT: showing  Status post splinting of a transverse impacted fracture of the   lateral malleolus extending up to the tibiotalar joint. Predominantly vertical nondisplaced fracture of the medial malleolus  extending to the posterior malleolus   Comminuted intra-articular fracture of the fifth metatarsal base which extends through the fifth metatarsal shaft and to the fifth metatarsal head/neck.    Comminuted and impacted fractures of the third and fourth metatarsal head neck.  - Splint applied by Podiatry Dressing to remain clean, dry, intact   - Patient to remain nonweightbearing to right lower extremity   - Recommend elevation of right lower extremity with 2 pillows    -Tylenol PRN mild pain, Oxycodone 2.5mg PRN moderate pain and morphine for severe pain
- CT: showing  Status post splinting of a transverse impacted fracture of the lateral malleolus extending up to the tibiotalar joint. Predominantly vertical nondisplaced fracture of the medial malleolus  extending to the posterior malleolus. Comminuted intra-articular fracture of the fifth metatarsal base which extends through the fifth metatarsal shaft and to the fifth metatarsal head/neck. Comminuted and impacted fractures of the third and fourth metatarsal head neck  - plan for R ankle ORIF with podiatry today, 2/18  - NWB to RLE, elevated RLE with 2 pillos  - pain control with Tylenol PRN mild pain, Oxycodone 2.5mg PRN moderate pain and morphine for severe pain

## 2025-02-20 NOTE — PROGRESS NOTE ADULT - REASON FOR ADMISSION
Ankle fracture

## 2025-02-20 NOTE — PROGRESS NOTE ADULT - PROBLEM SELECTOR PLAN 4
dvt ppx: lovenox
dvt ppx: lovenox
DVT ppx: lovenox    Dispo: NIK
dvt ppx: lovenox
dvt ppx: lovenox
DVT ppx: lovenox    Dispo: NIK
DVT ppx: lovenox    Dispo: NIK

## 2025-02-20 NOTE — PROGRESS NOTE ADULT - NSPROGADDITIONALINFOA_GEN_ALL_CORE
.  Alicia Saenz MD  Division of Hospital Medicine  [facility]   Available on [employer] Teams - messages preferred prior to calls.    Medically clear for discharge to [facility] today 2/20/25.  Discharge planning time spent: 36 minutes.    Plan discussed with patient, boyfriend bedside, and medicine VAHID Jordan.

## 2025-02-20 NOTE — PROGRESS NOTE ADULT - SUBJECTIVE AND OBJECTIVE BOX
Alicia Saenz MD  Division of Hospital Medicine  [facility]   Available on [employer] Teams (Mon-Fri 8am-5pm)    * messages preferred prior to calls  Other Times:  [phone]      Patient is a 84y old  Female who presents with a chief complaint of Ankle fracture (20 Feb 2025 11:38)      SUBJECTIVE / OVERNIGHT EVENTS: no acute events overnight. no fever, chills, chest pain, nor dyspnea. feels improved after after having BMs.   ADDITIONAL REVIEW OF SYSTEMS:    MEDICATIONS  (STANDING):  artificial tears (preservative free) Ophthalmic Solution 1 Drop(s) Both EYES three times a day  ascorbic acid 500 milliGRAM(s) Oral daily  aspirin enteric coated 81 milliGRAM(s) Oral daily  cholecalciferol 1000 Unit(s) Oral daily  enoxaparin Injectable 40 milliGRAM(s) SubCutaneous every 24 hours  melatonin 5 milliGRAM(s) Oral at bedtime  multivitamin 1 Tablet(s) Oral daily  pantoprazole    Tablet 40 milliGRAM(s) Oral before breakfast  polyethylene glycol 3350 17 Gram(s) Oral two times a day  senna 2 Tablet(s) Oral at bedtime  valACYclovir 1000 milliGRAM(s) Oral three times a day    MEDICATIONS  (PRN):  acetaminophen     Tablet .. 650 milliGRAM(s) Oral every 6 hours PRN Temp greater or equal to 38C (100.4F), Mild Pain (1 - 3)  aluminum hydroxide/magnesium hydroxide/simethicone Suspension 30 milliLiter(s) Oral every 4 hours PRN Dyspepsia  camphor 0.5%/menthol 0.5% Topical Lotion 1 Application(s) Topical two times a day PRN Itch  ondansetron Injectable 4 milliGRAM(s) IV Push every 8 hours PRN Nausea and/or Vomiting  oxycodone    5 mG/acetaminophen 325 mG 1 Tablet(s) Oral every 4 hours PRN Moderate Pain (4 - 6)  oxyCODONE    IR 10 milliGRAM(s) Oral every 4 hours PRN Severe Pain (7 - 10)      CAPILLARY BLOOD GLUCOSE        I&O's Summary    19 Feb 2025 07:01  -  20 Feb 2025 07:00  --------------------------------------------------------  IN: 480 mL / OUT: 700 mL / NET: -220 mL        PHYSICAL EXAM:  Vital Signs Last 24 Hrs  T(C): 36.7 (20 Feb 2025 13:00), Max: 36.7 (20 Feb 2025 04:40)  T(F): 98 (20 Feb 2025 13:00), Max: 98.1 (20 Feb 2025 04:40)  HR: 56 (20 Feb 2025 13:00) (56 - 80)  BP: 118/80 (20 Feb 2025 13:00) (118/80 - 136/65)  BP(mean): --  RR: 18 (20 Feb 2025 13:00) (18 - 18)  SpO2: 98% (20 Feb 2025 13:00) (95% - 98%)    Parameters below as of 20 Feb 2025 13:00  Patient On (Oxygen Delivery Method): room air    CONSTITUTIONAL: NAD, well-developed, well-groomed  EYES: PERRLA; conjunctiva and sclera clear  ENMT: Moist oral mucosa, no pharyngeal injection or exudates; normal dentition  NECK: Supple, no palpable masses; no thyromegaly  RESPIRATORY: Normal respiratory effort; lungs are clear to auscultation bilaterally  CARDIOVASCULAR: Regular rate and rhythm, normal S1 and S2, no murmur/rub/gallop; No lower extremity edema  ABDOMEN: Soft, Nondistended, Nontender to palpation, normoactive bowel sounds  MUSCULOSKELETAL: No clubbing or cyanosis of digits; +RLE in splint with ecchymoses of exposed toes  PSYCH: A+O to person, place, and time; affect appropriate  NEUROLOGY: CN 2-12 are intact and symmetric; no gross sensory deficits   SKIN: +shingles lesions on anterior chest with crusting, no open wounds, +mild pink discoloration of L dorsum of foot but no evidence of infection nor rash    LABS:                        13.4   8.97  )-----------( 268      ( 19 Feb 2025 07:17 )             40.8     02-19    135  |  99  |  19  ----------------------------<  113[H]  4.1   |  21[L]  |  0.71    Ca    8.9      19 Feb 2025 07:16      Urinalysis Basic - ( 19 Feb 2025 07:16 )    Color: x / Appearance: x / SG: x / pH: x  Gluc: 113 mg/dL / Ketone: x  / Bili: x / Urobili: x   Blood: x / Protein: x / Nitrite: x   Leuk Esterase: x / RBC: x / WBC x   Sq Epi: x / Non Sq Epi: x / Bacteria: x    RADIOLOGY & ADDITIONAL TESTS:  Results Reviewed:   Imaging Personally Reviewed:  Electrocardiogram Personally Reviewed:    COORDINATION OF CARE:  Care Discussed with Consultants/Other Providers [Y]: medicine VAHID Jordan  Prior or Outpatient Records Reviewed [Y/N]:  
Patient is a 84y old  Female who presents with a chief complaint of Ankle fracture (17 Feb 2025 10:14)       INTERVAL HPI/OVERNIGHT EVENTS:  Patient seen and evaluated at bedside.  Pt is resting comfortable in NAD. Denies N/V/F/C.      Allergies    sulfa drugs (Rash)  penicillin (Rash)    Intolerances    Ambien (Other)      Vital Signs Last 24 Hrs  T(C): 36.3 (17 Feb 2025 11:03), Max: 36.8 (17 Feb 2025 05:35)  T(F): 97.3 (17 Feb 2025 11:03), Max: 98.2 (17 Feb 2025 05:35)  HR: 69 (17 Feb 2025 11:03) (68 - 82)  BP: 141/72 (17 Feb 2025 11:03) (137/75 - 145/80)  BP(mean): --  RR: 18 (17 Feb 2025 11:03) (18 - 18)  SpO2: 96% (17 Feb 2025 11:03) (96% - 100%)    Parameters below as of 17 Feb 2025 11:03  Patient On (Oxygen Delivery Method): room air        LABS:                        13.6   7.80  )-----------( 236      ( 17 Feb 2025 11:15 )             41.8     02-17    136  |  99  |  15  ----------------------------<  104[H]  4.3   |  27  |  0.74    Ca    9.3      17 Feb 2025 11:15    TPro  6.7  /  Alb  3.8  /  TBili  0.6  /  DBili  x   /  AST  27  /  ALT  31  /  AlkPhos  58  02-17    PT/INR - ( 17 Feb 2025 11:15 )   PT: 11.8 sec;   INR: 1.03 ratio         PTT - ( 17 Feb 2025 11:15 )  PTT:25.0 sec  Urinalysis Basic - ( 17 Feb 2025 11:15 )    Color: x / Appearance: x / SG: x / pH: x  Gluc: 104 mg/dL / Ketone: x  / Bili: x / Urobili: x   Blood: x / Protein: x / Nitrite: x   Leuk Esterase: x / RBC: x / WBC x   Sq Epi: x / Non Sq Epi: x / Bacteria: x      CAPILLARY BLOOD GLUCOSE          Lower Extremity Physical Exam:  Vascular: DP/PT 2/4, B/L, CFT <3 seconds B/L, Temperature gradient warm to cool, B/L.   Neuro: Epicritic sensation intact to the level of digits, B/L.  Musculoskeletal/Ortho: pain on palpation of right foot and ankle  Skin: no open wounds, no blisters, no tenting, diffuse edema and ecchymosis of foot and ankle.     RADIOLOGY & ADDITIONAL TESTS:  
Patient is a 84y old  Female who presents with a chief complaint of Ankle fracture (19 Feb 2025 18:42)       INTERVAL HPI/OVERNIGHT EVENTS:  Patient seen and evaluated at bedside.  Pt is resting comfortable in NAD. Denies N/V/F/C.      Allergies    sulfa drugs (Rash)  latex (Rash)  penicillin (Rash)    Intolerances    Ambien (Other)      Vital Signs Last 24 Hrs  T(C): 36.7 (20 Feb 2025 04:40), Max: 36.7 (20 Feb 2025 04:40)  T(F): 98.1 (20 Feb 2025 04:40), Max: 98.1 (20 Feb 2025 04:40)  HR: 80 (20 Feb 2025 04:40) (80 - 80)  BP: 136/65 (20 Feb 2025 04:40) (136/65 - 136/65)  BP(mean): --  RR: 18 (20 Feb 2025 04:40) (18 - 18)  SpO2: 95% (20 Feb 2025 04:40) (95% - 95%)    Parameters below as of 20 Feb 2025 04:40  Patient On (Oxygen Delivery Method): room air        LABS:                        13.4   8.97  )-----------( 268      ( 19 Feb 2025 07:17 )             40.8     02-19    135  |  99  |  19  ----------------------------<  113[H]  4.1   |  21[L]  |  0.71    Ca    8.9      19 Feb 2025 07:16        Urinalysis Basic - ( 19 Feb 2025 07:16 )    Color: x / Appearance: x / SG: x / pH: x  Gluc: 113 mg/dL / Ketone: x  / Bili: x / Urobili: x   Blood: x / Protein: x / Nitrite: x   Leuk Esterase: x / RBC: x / WBC x   Sq Epi: x / Non Sq Epi: x / Bacteria: x      CAPILLARY BLOOD GLUCOSE          Lower Extremity Physical Exam:  Vascular: DP/PT 2/4, B/L, CFT <3 seconds B/L, Temperature gradient warm to cool, B/L.   Neuro: Epicritic sensation intact to the level of digits, B/L.  Musculoskeletal/Ortho: pain on palpation of right foot and ankle  Skin: dressing clean dry and intact    RADIOLOGY & ADDITIONAL TESTS:  
Podiatry Pager #: [phone]    Patient is a 84y old  Female who presents with a chief complaint of Ankle fracture (17 Feb 2025 11:58)      INTERVAL HPI/OVERNIGHT EVENTS:   Pt is scheduled for R ankle ORIF with Dr. Guzman at 4:00pm. Patient is aware of procedure and is NPO since midnight.    MEDICATIONS  (STANDING):  artificial tears (preservative free) Ophthalmic Solution 1 Drop(s) Both EYES three times a day  ascorbic acid 500 milliGRAM(s) Oral daily  aspirin enteric coated 81 milliGRAM(s) Oral daily  cholecalciferol 1000 Unit(s) Oral daily  enoxaparin Injectable 40 milliGRAM(s) SubCutaneous every 24 hours  melatonin 5 milliGRAM(s) Oral at bedtime  multivitamin 1 Tablet(s) Oral daily  pantoprazole    Tablet 40 milliGRAM(s) Oral before breakfast  polyethylene glycol 3350 17 Gram(s) Oral daily  senna 2 Tablet(s) Oral at bedtime  valACYclovir 1000 milliGRAM(s) Oral three times a day    MEDICATIONS  (PRN):  acetaminophen     Tablet .. 650 milliGRAM(s) Oral every 6 hours PRN Temp greater or equal to 38C (100.4F), Mild Pain (1 - 3)  aluminum hydroxide/magnesium hydroxide/simethicone Suspension 30 milliLiter(s) Oral every 4 hours PRN Dyspepsia  morphine   Solution 5 milliGRAM(s) Oral every 4 hours PRN Severe Pain (7 - 10)  ondansetron Injectable 4 milliGRAM(s) IV Push every 8 hours PRN Nausea and/or Vomiting  oxyCODONE    IR 2.5 milliGRAM(s) Oral every 6 hours PRN Moderate Pain (4 - 6)      Allergies    sulfa drugs (Rash)  penicillin (Rash)    Intolerances    Ambien (Other)      Vital Signs Last 24 Hrs  T(C): 36.4 (18 Feb 2025 05:01), Max: 36.5 (17 Feb 2025 20:45)  T(F): 97.6 (18 Feb 2025 05:01), Max: 97.7 (17 Feb 2025 20:45)  HR: 87 (18 Feb 2025 05:01) (69 - 87)  BP: 153/80 (18 Feb 2025 05:01) (141/72 - 153/80)  BP(mean): --  RR: 18 (18 Feb 2025 05:01) (18 - 18)  SpO2: 97% (18 Feb 2025 05:01) (96% - 97%)    Parameters below as of 18 Feb 2025 05:01  Patient On (Oxygen Delivery Method): room air        LABS:                        13.9   7.57  )-----------( 249      ( 18 Feb 2025 04:21 )             41.2     02-18    135  |  101  |  13  ----------------------------<  114[H]  4.1   |  23  |  0.63    Ca    9.5      18 Feb 2025 04:21    TPro  6.7  /  Alb  3.8  /  TBili  0.6  /  DBili  x   /  AST  27  /  ALT  31  /  AlkPhos  58  02-17    PT/INR - ( 18 Feb 2025 04:21 )   PT: 11.8 sec;   INR: 1.03 ratio         PTT - ( 18 Feb 2025 04:21 )  PTT:27.9 sec  Urinalysis Basic - ( 18 Feb 2025 04:21 )    Color: x / Appearance: x / SG: x / pH: x  Gluc: 114 mg/dL / Ketone: x  / Bili: x / Urobili: x   Blood: x / Protein: x / Nitrite: x   Leuk Esterase: x / RBC: x / WBC x   Sq Epi: x / Non Sq Epi: x / Bacteria: x      CAPILLARY BLOOD GLUCOSE          RADIOLOGY & ADDITIONAL TESTS:    Plan:   To OR today at 4:00pm with Dr. Guzman for R ankle ORIF.   CXR on sunrise.  EKG on sunrise.  Medical/Cardiac clearance since 2/15 and documented in chart.  Consent signed and in chart.  Procedure was explained to patient in detail. All alternatives, risks and complications were discussed. All questions answered.
no complaints.    GENERAL: No fevers, no chills.  EYES: No blurry vision,  No photophobia  ENT: No sore throat.  No dysphagia  Cardiovascular: No chest pain, palpitations, orthopnea  Pulmonary: No cough, no wheezing. No shortness of breath  Gastrointestinal: No abdominal pain, no diarrhea, no constipation.   Musculoskeletal: No weakness.  No myalgias.  Dermatology:  No rashes.  Neuro: No Headache.  No vertigo.  No dizziness.  Psych: No anxiety, no depression.  Denies suicidal thoughts.    MEDICATIONS  (STANDING):  artificial tears (preservative free) Ophthalmic Solution 1 Drop(s) Both EYES three times a day  ascorbic acid 500 milliGRAM(s) Oral daily  aspirin enteric coated 81 milliGRAM(s) Oral daily  cholecalciferol 1000 Unit(s) Oral daily  enoxaparin Injectable 40 milliGRAM(s) SubCutaneous every 24 hours  melatonin 5 milliGRAM(s) Oral at bedtime  multivitamin 1 Tablet(s) Oral daily  pantoprazole    Tablet 40 milliGRAM(s) Oral before breakfast  valACYclovir 1000 milliGRAM(s) Oral three times a day    MEDICATIONS  (PRN):  acetaminophen     Tablet .. 650 milliGRAM(s) Oral every 6 hours PRN Temp greater or equal to 38C (100.4F), Mild Pain (1 - 3)  aluminum hydroxide/magnesium hydroxide/simethicone Suspension 30 milliLiter(s) Oral every 4 hours PRN Dyspepsia  morphine   Solution 5 milliGRAM(s) Oral every 4 hours PRN Severe Pain (7 - 10)  ondansetron Injectable 4 milliGRAM(s) IV Push every 8 hours PRN Nausea and/or Vomiting  oxyCODONE    IR 5 milliGRAM(s) Oral every 6 hours PRN Moderate Pain (4 - 6)    Vital Signs Last 24 Hrs  T(C): 36.7 (2025 09:29), Max: 36.9 (2025 05:33)  T(F): 98 (2025 09:29), Max: 98.4 (2025 05:33)  HR: 70 (2025 09:29) (70 - 82)  BP: 147/73 (2025 09:29) (126/72 - 147/75)  BP(mean): --  RR: 18 (2025 09:29) (17 - 18)  SpO2: 95% (2025 09:29) (94% - 97%)    Parameters below as of 2025 09:29  Patient On (Oxygen Delivery Method): room air    GENERAL: NAD  HEAD:  Atraumatic, Normocephalic  EYES: EOMI, PERRLA, conjunctiva and sclera clear  ENT: Pharynx not erythematous  PULMONARY: Clear to auscultation bilaterally; No wheeze  CARDIOVASCULAR: Regular rate and rhythm; No murmurs, rubs, or gallops  ABDOMEN: Soft, Nontender, Nondistended; Bowel sounds present  EXTREMITIES:  2+ Peripheral Pulses, No clubbing, cyanosis, or edema  PSYCH: AAOx3, normal affect  SKIN: shingles lesions crusting    .  LABS:             Urinalysis Basic - ( 2025 22:40 )    Color: Yellow / Appearance: Clear / S.009 / pH: x  Gluc: x / Ketone: Negative mg/dL  / Bili: Negative / Urobili: 1.0 mg/dL   Blood: x / Protein: Negative mg/dL / Nitrite: Negative   Leuk Esterase: Moderate / RBC: 0 /HPF / WBC 9 /HPF   Sq Epi: x / Non Sq Epi: 4 /HPF / Bacteria: Occasional /HPF            RADIOLOGY, EKG & ADDITIONAL TESTS: Reviewed. 
Patient is a 84y old  Female who presents with a chief complaint of Ankle fracture (13 Feb 2025 17:11)       INTERVAL HPI/OVERNIGHT EVENTS:  Patient seen and evaluated at bedside.  Pt is resting comfortable in NAD. Denies N/V/F/C.      Allergies    sulfa drugs (Rash)  penicillin (Rash)    Intolerances    Ambien (Other)      Vital Signs Last 24 Hrs  T(C): 37.2 (14 Feb 2025 05:11), Max: 37.2 (14 Feb 2025 05:11)  T(F): 99 (14 Feb 2025 05:11), Max: 99 (14 Feb 2025 05:11)  HR: 87 (14 Feb 2025 05:11) (72 - 87)  BP: 132/81 (14 Feb 2025 05:11) (99/62 - 134/75)  BP(mean): --  RR: 18 (14 Feb 2025 05:11) (15 - 18)  SpO2: 97% (14 Feb 2025 05:11) (95% - 98%)    Parameters below as of 14 Feb 2025 05:11  Patient On (Oxygen Delivery Method): room air        LABS:                        14.4   8.13  )-----------( 217      ( 13 Feb 2025 12:27 )             45.1     02-13    138  |  102  |  11  ----------------------------<  138[H]  5.2   |  25  |  0.76    Ca    9.5      13 Feb 2025 12:27    TPro  6.9  /  Alb  4.0  /  TBili  0.6  /  DBili  x   /  AST  44[H]  /  ALT  35  /  AlkPhos  58  02-13      Urinalysis Basic - ( 13 Feb 2025 12:27 )    Color: x / Appearance: x / SG: x / pH: x  Gluc: 138 mg/dL / Ketone: x  / Bili: x / Urobili: x   Blood: x / Protein: x / Nitrite: x   Leuk Esterase: x / RBC: x / WBC x   Sq Epi: x / Non Sq Epi: x / Bacteria: x      CAPILLARY BLOOD GLUCOSE          Lower Extremity Physical Exam:  Vascular: DP/PT 2/4, B/L, CFT <3 seconds B/L, Temperature gradient warm to cool, B/L.   Neuro: Epicritic sensation intact to the level of digits, B/L.  Musculoskeletal/Ortho: RLE  localized tenderness to distal medial and lateral malleoli, localized tenderness along the 5th metatarsal neck and base, pain with ankle range of motion, pain with midtarsal joint range of motion  Skin: diffuse RLE 2+ pitting edema with lateral foot and ankle ecchymosis no skin tenting, no signs of compartment syndrome, no open wounds    RADIOLOGY & ADDITIONAL TESTS:  < from: CT Lower Extremity No Cont, Right (02.13.25 @ 12:09) >    ACC: [id number] EXAM:  CT 3D RECONSTRUCT W [place]   ORDERED BY:  HARPREET KNOX     ACC: [id number] EXAM:  CT LWR EXT RT   ORDERED BY:  HARPREET KNOX     PROCEDURE DATE:  02/13/2025          INTERPRETATION:  Exam Type: CT LOWER EXTREMITY RIGHT, CT 3D   RECONSTRUCTION W WORKSTATION  Exam Date and Time: 2/13/2025 12:09 PM  Indication: Fracture  Comparison: Foot and ankle radiographs from same day    TECHNIQUE:  Multiplanar CT images of the right lower extremity were obtained without   contrast. 3-D reconstructions were performed    FINDINGS:    Status post splinting of a transverse impacted fracture of the lateral   malleolus extending up to the tibiotalar joint. Predominantly vertical   nondisplaced fracture of the medial malleolus extending to the posterior   malleolus.    Comminuted intra-articular fracture of the fifth metatarsal base which   extends through the fifth metatarsal shaft and to the fifth metatarsal   head/neck. Comminuted and impacted fractures of the third and fourth   metatarsal head neck.    Bones are osteopenic.    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 Moderate osteoarthritis of the midfoot.    Vascular calcifications are present. There is plantar and retrocalcaneal   enthesophyte formation.    Moderate soft tissue swelling about the ankle and foot.    IMPRESSION:  1.  Status post splinting of a transverse impacted fracture of the   lateral malleolus extending up to the tibiotalar joint.  2.  Predominantly vertical nondisplaced fracture of the medial malleolus   extending to the posterior malleolus.  3.  Comminuted intra-articular fracture of the fifth metatarsal base   which extends through the fifth metatarsal shaft and to the fifth   metatarsal head/neck.  4.  Comminuted and impacted fractures of the third and fourth metatarsal   head neck.    --- End of Report ---            BRIDGETTE MCKENNA DO; Attending Radiologist  This document has been electronically signed. Feb 13 2025 12:31PM    < end of copied text >  
Patient is a 84y old  Female who presents with a chief complaint of Ankle fracture (2025 11:01)       INTERVAL HPI/OVERNIGHT EVENTS:  Patient seen and evaluated at bedside.  Pt is resting comfortable in NAD. Denies N/V/F/C.      Allergies    sulfa drugs (Rash)  penicillin (Rash)    Intolerances    Ambien (Other)      Vital Signs Last 24 Hrs  T(C): 36.8 (15 Feb 2025 04:37), Max: 37.1 (2025 11:36)  T(F): 98.3 (15 Feb 2025 04:37), Max: 98.8 (2025 11:36)  HR: 71 (15 Feb 2025 04:37) (71 - 79)  BP: 134/73 (15 Feb 2025 04:37) (124/70 - 134/73)  BP(mean): --  RR: 18 (15 Feb 2025 04:37) (18 - 18)  SpO2: 96% (15 Feb 2025 04:37) (94% - 96%)    Parameters below as of 15 Feb 2025 04:37  Patient On (Oxygen Delivery Method): room air        LABS:                        14.4   8.13  )-----------( 217      ( 2025 12:27 )             45.1     02-13    138  |  102  |  11  ----------------------------<  138[H]  5.2   |  25  |  0.76    Ca    9.5      2025 12:27    TPro  6.9  /  Alb  4.0  /  TBili  0.6  /  DBili  x   /  AST  44[H]  /  ALT  35  /  AlkPhos  58  02-13      Urinalysis Basic - ( 2025 22:40 )    Color: Yellow / Appearance: Clear / S.009 / pH: x  Gluc: x / Ketone: Negative mg/dL  / Bili: Negative / Urobili: 1.0 mg/dL   Blood: x / Protein: Negative mg/dL / Nitrite: Negative   Leuk Esterase: Moderate / RBC: 0 /HPF / WBC 9 /HPF   Sq Epi: x / Non Sq Epi: 4 /HPF / Bacteria: Occasional /HPF      CAPILLARY BLOOD GLUCOSE          Lower Extremity Physical Exam:  Vascular: DP/PT 2/4, B/L, CFT <3 seconds B/L, Temperature gradient warm to cool, B/L.   Neuro: Epicritic sensation intact to the level of digits, B/L.  Musculoskeletal/Ortho: dressing left CDI    RADIOLOGY & ADDITIONAL TESTS:  
Patient is a 84y old  Female who presents with a chief complaint of Ankle fracture (18 Feb 2025 18:36)       INTERVAL HPI/OVERNIGHT EVENTS:  Patient seen and evaluated at bedside.  Pt is resting comfortable in NAD. Denies N/V/F/C.      Allergies    sulfa drugs (Rash)  latex (Rash)  penicillin (Rash)    Intolerances    Ambien (Other)      Vital Signs Last 24 Hrs  T(C): 36.5 (19 Feb 2025 05:05), Max: 36.9 (18 Feb 2025 15:45)  T(F): 97.7 (19 Feb 2025 05:05), Max: 98.4 (18 Feb 2025 15:45)  HR: 68 (19 Feb 2025 05:05) (66 - 82)  BP: 118/66 (19 Feb 2025 05:05) (118/66 - 150/73)  BP(mean): 86 (18 Feb 2025 20:15) (86 - 102)  RR: 18 (19 Feb 2025 05:05) (17 - 18)  SpO2: 97% (19 Feb 2025 05:05) (91% - 100%)    Parameters below as of 19 Feb 2025 05:05  Patient On (Oxygen Delivery Method): room air        LABS:                        13.4   8.97  )-----------( 268      ( 19 Feb 2025 07:17 )             40.8     02-19    135  |  99  |  19  ----------------------------<  113[H]  4.1   |  21[L]  |  0.71    Ca    8.9      19 Feb 2025 07:16    TPro  6.7  /  Alb  3.8  /  TBili  0.6  /  DBili  x   /  AST  27  /  ALT  31  /  AlkPhos  58  02-17    PT/INR - ( 18 Feb 2025 04:21 )   PT: 11.8 sec;   INR: 1.03 ratio         PTT - ( 18 Feb 2025 04:21 )  PTT:27.9 sec  Urinalysis Basic - ( 19 Feb 2025 07:16 )    Color: x / Appearance: x / SG: x / pH: x  Gluc: 113 mg/dL / Ketone: x  / Bili: x / Urobili: x   Blood: x / Protein: x / Nitrite: x   Leuk Esterase: x / RBC: x / WBC x   Sq Epi: x / Non Sq Epi: x / Bacteria: x      CAPILLARY BLOOD GLUCOSE          Lower Extremity Physical Exam:  Vascular: DP/PT 2/4, B/L, CFT <3 seconds B/L, Temperature gradient warm to cool, B/L.   Neuro: Epicritic sensation intact to the level of digits, B/L.  Musculoskeletal/Ortho: pain on palpation of right foot and ankle  Skin: dressing clean dry and intact      RADIOLOGY & ADDITIONAL TESTS:  
Alicia Saenz MD  Division of Hospital Medicine  [facility]   Available on [employer] Teams (Mon-Fri 8am-5pm)    * messages preferred prior to calls  Other Times:  [phone]      Patient is a 84y old  Female who presents with a chief complaint of Ankle fracture (18 Feb 2025 07:11)      SUBJECTIVE / OVERNIGHT EVENTS: seen and examined earlier this morning. no acute events overnight. awaiting ORIF with podiatry at time of my exam. pain relatively controlled though with increased pain closer to surgery time given NPO status  ADDITIONAL REVIEW OF SYSTEMS:    MEDICATIONS  (STANDING):  artificial tears (preservative free) Ophthalmic Solution 1 Drop(s) Both EYES three times a day  ascorbic acid 500 milliGRAM(s) Oral daily  aspirin enteric coated 81 milliGRAM(s) Oral daily  cholecalciferol 1000 Unit(s) Oral daily  enoxaparin Injectable 40 milliGRAM(s) SubCutaneous every 24 hours  melatonin 5 milliGRAM(s) Oral at bedtime  multivitamin 1 Tablet(s) Oral daily  pantoprazole    Tablet 40 milliGRAM(s) Oral before breakfast  polyethylene glycol 3350 17 Gram(s) Oral daily  senna 2 Tablet(s) Oral at bedtime  valACYclovir 1000 milliGRAM(s) Oral three times a day    MEDICATIONS  (PRN):  acetaminophen     Tablet .. 650 milliGRAM(s) Oral every 6 hours PRN Temp greater or equal to 38C (100.4F), Mild Pain (1 - 3)  aluminum hydroxide/magnesium hydroxide/simethicone Suspension 30 milliLiter(s) Oral every 4 hours PRN Dyspepsia  morphine   Solution 5 milliGRAM(s) Oral every 4 hours PRN Severe Pain (7 - 10)  ondansetron Injectable 4 milliGRAM(s) IV Push every 8 hours PRN Nausea and/or Vomiting  oxyCODONE    IR 2.5 milliGRAM(s) Oral every 6 hours PRN Moderate Pain (4 - 6)      CAPILLARY BLOOD GLUCOSE        I&O's Summary    17 Feb 2025 07:01  -  18 Feb 2025 07:00  --------------------------------------------------------  IN: 120 mL / OUT: 1750 mL / NET: -1630 mL        PHYSICAL EXAM:  Vital Signs Last 24 Hrs  T(C): 36.9 (18 Feb 2025 16:03), Max: 36.9 (18 Feb 2025 15:45)  T(F): 98.4 (18 Feb 2025 15:45), Max: 98.4 (18 Feb 2025 15:45)  HR: 82 (18 Feb 2025 16:03) (70 - 87)  BP: 150/73 (18 Feb 2025 16:03) (126/75 - 153/80)  BP(mean): 102 (18 Feb 2025 16:03) (102 - 102)  RR: 17 (18 Feb 2025 16:03) (17 - 18)  SpO2: 97% (18 Feb 2025 16:03) (96% - 97%)    Parameters below as of 18 Feb 2025 12:41  Patient On (Oxygen Delivery Method): room air    CONSTITUTIONAL: NAD, well-developed, well-groomed  EYES: PERRLA; conjunctiva and sclera clear  ENMT: Moist oral mucosa, no pharyngeal injection or exudates; normal dentition  NECK: Supple, no palpable masses; no thyromegaly  RESPIRATORY: Normal respiratory effort; lungs are clear to auscultation bilaterally  CARDIOVASCULAR: Regular rate and rhythm, normal S1 and S2, no murmur/rub/gallop; No lower extremity edema; Peripheral pulses are 2+ bilaterally  ABDOMEN: Soft, Nondistended, Nontender to palpation, normoactive bowel sounds  MUSCULOSKELETAL: No clubbing or cyanosis of digits; +RLE in splint with ecchymoses of exposed toes  PSYCH: A+O to person, place, and time; affect appropriate  NEUROLOGY: CN 2-12 are intact and symmetric; no gross sensory deficits   SKIN: + shingles lesions with crusting, no open wounds      LABS:                        13.9   7.57  )-----------( 249      ( 18 Feb 2025 04:21 )             41.2     02-18    135  |  101  |  13  ----------------------------<  114[H]  4.1   |  23  |  0.63    Ca    9.5      18 Feb 2025 04:21    TPro  6.7  /  Alb  3.8  /  TBili  0.6  /  DBili  x   /  AST  27  /  ALT  31  /  AlkPhos  58  02-17    PT/INR - ( 18 Feb 2025 04:21 )   PT: 11.8 sec;   INR: 1.03 ratio         PTT - ( 18 Feb 2025 04:21 )  PTT:27.9 sec      Urinalysis Basic - ( 18 Feb 2025 04:21 )    Color: x / Appearance: x / SG: x / pH: x  Gluc: 114 mg/dL / Ketone: x  / Bili: x / Urobili: x   Blood: x / Protein: x / Nitrite: x   Leuk Esterase: x / RBC: x / WBC x   Sq Epi: x / Non Sq Epi: x / Bacteria: x      RADIOLOGY & ADDITIONAL TESTS:  Results Reviewed:   Imaging Personally Reviewed:  Electrocardiogram Personally Reviewed:    COORDINATION OF CARE:  Care Discussed with Consultants/Other Providers [Y]: medicine VAHID Juarez  Prior or Outpatient Records Reviewed [Y/N]:  
Alicia Saenz MD  Division of Hospital Medicine  [facility]   Available on [employer] Teams (Mon-Fri 8am-5pm)    * messages preferred prior to calls  Other Times:  [phone]      Patient is a 84y old  Female who presents with a chief complaint of Ankle fracture (19 Feb 2025 09:07)      SUBJECTIVE / OVERNIGHT EVENTS: no acute events overnight. uncomfortable due to constipation but later was able to have BM after SMOG enema. otherwise doing well post-operatively.  ADDITIONAL REVIEW OF SYSTEMS:    MEDICATIONS  (STANDING):  artificial tears (preservative free) Ophthalmic Solution 1 Drop(s) Both EYES three times a day  ascorbic acid 500 milliGRAM(s) Oral daily  aspirin enteric coated 81 milliGRAM(s) Oral daily  cholecalciferol 1000 Unit(s) Oral daily  enoxaparin Injectable 40 milliGRAM(s) SubCutaneous every 24 hours  melatonin 5 milliGRAM(s) Oral at bedtime  multivitamin 1 Tablet(s) Oral daily  pantoprazole    Tablet 40 milliGRAM(s) Oral before breakfast  polyethylene glycol 3350 17 Gram(s) Oral two times a day  senna 2 Tablet(s) Oral at bedtime  valACYclovir 1000 milliGRAM(s) Oral three times a day    MEDICATIONS  (PRN):  acetaminophen     Tablet .. 650 milliGRAM(s) Oral every 6 hours PRN Temp greater or equal to 38C (100.4F), Mild Pain (1 - 3)  aluminum hydroxide/magnesium hydroxide/simethicone Suspension 30 milliLiter(s) Oral every 4 hours PRN Dyspepsia  morphine  - Injectable 2 milliGRAM(s) IV Push every 6 hours PRN Severe Pain (7 - 10)  ondansetron Injectable 4 milliGRAM(s) IV Push every 8 hours PRN Nausea and/or Vomiting  oxycodone    5 mG/acetaminophen 325 mG 1 Tablet(s) Oral every 4 hours PRN Moderate Pain (4 - 6)      CAPILLARY BLOOD GLUCOSE        I&O's Summary    18 Feb 2025 07:01  -  19 Feb 2025 07:00  --------------------------------------------------------  IN: 200 mL / OUT: 400 mL / NET: -200 mL        PHYSICAL EXAM:  Vital Signs Last 24 Hrs  T(C): 36.7 (19 Feb 2025 10:04), Max: 36.9 (18 Feb 2025 20:00)  T(F): 98.1 (19 Feb 2025 10:04), Max: 98.4 (18 Feb 2025 20:00)  HR: 70 (19 Feb 2025 10:04) (66 - 73)  BP: 111/62 (19 Feb 2025 10:04) (111/62 - 143/69)  BP(mean): 86 (18 Feb 2025 20:15) (86 - 99)  RR: 18 (19 Feb 2025 10:04) (17 - 18)  SpO2: 99% (19 Feb 2025 10:04) (91% - 100%)    Parameters below as of 19 Feb 2025 10:04  Patient On (Oxygen Delivery Method): room air    CONSTITUTIONAL: NAD, well-developed, well-groomed  EYES: PERRLA; conjunctiva and sclera clear  ENMT: Moist oral mucosa, no pharyngeal injection or exudates; normal dentition  NECK: Supple, no palpable masses; no thyromegaly  RESPIRATORY: Normal respiratory effort; lungs are clear to auscultation bilaterally  CARDIOVASCULAR: Regular rate and rhythm, normal S1 and S2, no murmur/rub/gallop; No lower extremity edema  ABDOMEN: Soft, Nondistended, Nontender to palpation, normoactive bowel sounds  MUSCULOSKELETAL: No clubbing or cyanosis of digits; +RLE in splint with ecchymoses of exposed toes  PSYCH: A+O to person, place, and time; affect appropriate  NEUROLOGY: CN 2-12 are intact and symmetric; no gross sensory deficits   SKIN: +shingles lesions on anterior chest with crusting, no open wounds    LABS:                        13.4   8.97  )-----------( 268      ( 19 Feb 2025 07:17 )             40.8     02-19    135  |  99  |  19  ----------------------------<  113[H]  4.1   |  21[L]  |  0.71    Ca    8.9      19 Feb 2025 07:16      PT/INR - ( 18 Feb 2025 04:21 )   PT: 11.8 sec;   INR: 1.03 ratio         PTT - ( 18 Feb 2025 04:21 )  PTT:27.9 sec      Urinalysis Basic - ( 19 Feb 2025 07:16 )    Color: x / Appearance: x / SG: x / pH: x  Gluc: 113 mg/dL / Ketone: x  / Bili: x / Urobili: x   Blood: x / Protein: x / Nitrite: x   Leuk Esterase: x / RBC: x / WBC x   Sq Epi: x / Non Sq Epi: x / Bacteria: x      RADIOLOGY & ADDITIONAL TESTS:  Results Reviewed:   Imaging Personally Reviewed:  Electrocardiogram Personally Reviewed:    COORDINATION OF CARE:  Care Discussed with Consultants/Other Providers [Y]: medicine VAHID Juarez  Prior or Outpatient Records Reviewed [Y/N]:  
no complaints.    GENERAL: No fevers, no chills.  EYES: No blurry vision,  No photophobia  ENT: No sore throat.  No dysphagia  Cardiovascular: No chest pain, palpitations, orthopnea  Pulmonary: No cough, no wheezing. No shortness of breath  Gastrointestinal: No abdominal pain, no diarrhea, no constipation.   Musculoskeletal: No weakness.  No myalgias.  Dermatology:  No rashes.  Neuro: No Headache.  No vertigo.  No dizziness.  Psych: No anxiety, no depression.  Denies suicidal thoughts.    MEDICATIONS  (STANDING):  artificial tears (preservative free) Ophthalmic Solution 1 Drop(s) Both EYES three times a day  ascorbic acid 500 milliGRAM(s) Oral daily  aspirin enteric coated 81 milliGRAM(s) Oral daily  cholecalciferol 1000 Unit(s) Oral daily  enoxaparin Injectable 40 milliGRAM(s) SubCutaneous every 24 hours  multivitamin 1 Tablet(s) Oral daily  pantoprazole    Tablet 40 milliGRAM(s) Oral before breakfast  valACYclovir 1000 milliGRAM(s) Oral three times a day    MEDICATIONS  (PRN):  acetaminophen     Tablet .. 650 milliGRAM(s) Oral every 6 hours PRN Temp greater or equal to 38C (100.4F), Mild Pain (1 - 3)  aluminum hydroxide/magnesium hydroxide/simethicone Suspension 30 milliLiter(s) Oral every 4 hours PRN Dyspepsia  melatonin 3 milliGRAM(s) Oral at bedtime PRN Insomnia  morphine   Solution 5 milliGRAM(s) Oral every 4 hours PRN Severe Pain (7 - 10)  ondansetron Injectable 4 milliGRAM(s) IV Push every 8 hours PRN Nausea and/or Vomiting    Vital Signs Last 24 Hrs  T(C): 36.8 (15 Feb 2025 04:37), Max: 37.1 (2025 11:36)  T(F): 98.3 (15 Feb 2025 04:37), Max: 98.8 (2025 11:36)  HR: 71 (15 Feb 2025 04:37) (71 - 79)  BP: 134/73 (15 Feb 2025 04:37) (124/70 - 134/73)  BP(mean): --  RR: 18 (15 Feb 2025 04:37) (18 - 18)  SpO2: 96% (15 Feb 2025 04:37) (94% - 96%)    Parameters below as of 15 Feb 2025 04:37  Patient On (Oxygen Delivery Method): room air    GENERAL: NAD  HEAD:  Atraumatic, Normocephalic  EYES: EOMI, PERRLA, conjunctiva and sclera clear  ENT: Pharynx not erythematous  PULMONARY: Clear to auscultation bilaterally; No wheeze  CARDIOVASCULAR: Regular rate and rhythm; No murmurs, rubs, or gallops  ABDOMEN: Soft, Nontender, Nondistended; Bowel sounds present  EXTREMITIES:  2+ Peripheral Pulses, No clubbing, cyanosis, or edema  PSYCH: AAOx3, normal affect  SKIN: shingles lesions crusting    .  LABS:                         14.4   8.13  )-----------( 217      ( 2025 12:27 )             45.1     02-13    138  |  102  |  11  ----------------------------<  138[H]  5.2   |  25  |  0.76    Ca    9.5      2025 12:27    TPro  6.9  /  Alb  4.0  /  TBili  0.6  /  DBili  x   /  AST  44[H]  /  ALT  35  /  AlkPhos  58  02-13      Urinalysis Basic - ( 2025 22:40 )    Color: Yellow / Appearance: Clear / S.009 / pH: x  Gluc: x / Ketone: Negative mg/dL  / Bili: Negative / Urobili: 1.0 mg/dL   Blood: x / Protein: Negative mg/dL / Nitrite: Negative   Leuk Esterase: Moderate / RBC: 0 /HPF / WBC 9 /HPF   Sq Epi: x / Non Sq Epi: 4 /HPF / Bacteria: Occasional /HPF            RADIOLOGY, EKG & ADDITIONAL TESTS: Reviewed. 
no complaints. pain is okay.    GENERAL: No fevers, no chills.  EYES: No blurry vision,  No photophobia  ENT: No sore throat.  No dysphagia  Cardiovascular: No chest pain, palpitations, orthopnea  Pulmonary: No cough, no wheezing. No shortness of breath  Gastrointestinal: No abdominal pain, no diarrhea, no constipation.   Musculoskeletal: No weakness.  No myalgias.  Dermatology:  No rashes.  Neuro: No Headache.  No vertigo.  No dizziness.  Psych: No anxiety, no depression.  Denies suicidal thoughts.    MEDICATIONS  (STANDING):  artificial tears (preservative free) Ophthalmic Solution 1 Drop(s) Both EYES three times a day  ascorbic acid 500 milliGRAM(s) Oral daily  aspirin enteric coated 81 milliGRAM(s) Oral daily  cholecalciferol 1000 Unit(s) Oral daily  enoxaparin Injectable 40 milliGRAM(s) SubCutaneous every 24 hours  melatonin 5 milliGRAM(s) Oral at bedtime  multivitamin 1 Tablet(s) Oral daily  pantoprazole    Tablet 40 milliGRAM(s) Oral before breakfast  valACYclovir 1000 milliGRAM(s) Oral three times a day    MEDICATIONS  (PRN):  acetaminophen     Tablet .. 650 milliGRAM(s) Oral every 6 hours PRN Temp greater or equal to 38C (100.4F), Mild Pain (1 - 3)  aluminum hydroxide/magnesium hydroxide/simethicone Suspension 30 milliLiter(s) Oral every 4 hours PRN Dyspepsia  morphine   Solution 5 milliGRAM(s) Oral every 4 hours PRN Severe Pain (7 - 10)  ondansetron Injectable 4 milliGRAM(s) IV Push every 8 hours PRN Nausea and/or Vomiting  oxyCODONE    IR 2.5 milliGRAM(s) Oral every 6 hours PRN Moderate Pain (4 - 6)    T(C): 36.8 (02-17-25 @ 05:35), Max: 36.8 (02-17-25 @ 05:35)  HR: 68 (02-17-25 @ 05:35) (68 - 82)  BP: 145/80 (02-17-25 @ 05:35) (137/75 - 145/80)  RR: 18 (02-17-25 @ 05:35) (18 - 18)  SpO2: 96% (02-17-25 @ 05:35) (96% - 100%)    Vital Signs Last 24 Hrs  T(C): 36.8 (17 Feb 2025 05:35), Max: 36.8 (17 Feb 2025 05:35)  T(F): 98.2 (17 Feb 2025 05:35), Max: 98.2 (17 Feb 2025 05:35)  HR: 68 (17 Feb 2025 05:35) (68 - 82)  BP: 145/80 (17 Feb 2025 05:35) (137/75 - 145/80)  BP(mean): --  RR: 18 (17 Feb 2025 05:35) (18 - 18)  SpO2: 96% (17 Feb 2025 05:35) (96% - 100%)    Parameters below as of 17 Feb 2025 05:35  Patient On (Oxygen Delivery Method): room air    GENERAL: NAD  HEAD:  Atraumatic, Normocephalic  EYES: EOMI, PERRLA, conjunctiva and sclera clear  ENT: Pharynx not erythematous  PULMONARY: Clear to auscultation bilaterally; No wheeze  CARDIOVASCULAR: Regular rate and rhythm; No murmurs, rubs, or gallops  ABDOMEN: Soft, Nontender, Nondistended; Bowel sounds present  EXTREMITIES:  2+ Peripheral Pulses, No clubbing, cyanosis, or edema  PSYCH: AAOx3, normal affect  SKIN: shingles lesions crusting    .  LABS:                     RADIOLOGY, EKG & ADDITIONAL TESTS: Reviewed. 
no complaints.    GENERAL: No fevers, no chills.  EYES: No blurry vision,  No photophobia  ENT: No sore throat.  No dysphagia  Cardiovascular: No chest pain, palpitations, orthopnea  Pulmonary: No cough, no wheezing. No shortness of breath  Gastrointestinal: No abdominal pain, no diarrhea, no constipation.   Musculoskeletal: No weakness.  No myalgias.  Dermatology:  No rashes.  Neuro: No Headache.  No vertigo.  No dizziness.  Psych: No anxiety, no depression.  Denies suicidal thoughts.    MEDICATIONS  (STANDING):  artificial tears (preservative free) Ophthalmic Solution 1 Drop(s) Both EYES three times a day  ascorbic acid 500 milliGRAM(s) Oral daily  aspirin enteric coated 81 milliGRAM(s) Oral daily  cholecalciferol 1000 Unit(s) Oral daily  multivitamin 1 Tablet(s) Oral daily  pantoprazole    Tablet 40 milliGRAM(s) Oral before breakfast  valACYclovir 1000 milliGRAM(s) Oral three times a day    MEDICATIONS  (PRN):  acetaminophen     Tablet .. 650 milliGRAM(s) Oral every 6 hours PRN Temp greater or equal to 38C (100.4F), Mild Pain (1 - 3)  aluminum hydroxide/magnesium hydroxide/simethicone Suspension 30 milliLiter(s) Oral every 4 hours PRN Dyspepsia  melatonin 3 milliGRAM(s) Oral at bedtime PRN Insomnia  ondansetron Injectable 4 milliGRAM(s) IV Push every 8 hours PRN Nausea and/or Vomiting  oxyCODONE    IR 5 milliGRAM(s) Oral every 6 hours PRN Severe Pain (7 - 10)    Vital Signs Last 24 Hrs  T(C): 37.2 (14 Feb 2025 05:11), Max: 37.2 (14 Feb 2025 05:11)  T(F): 99 (14 Feb 2025 05:11), Max: 99 (14 Feb 2025 05:11)  HR: 87 (14 Feb 2025 05:11) (72 - 87)  BP: 132/81 (14 Feb 2025 05:11) (99/62 - 134/75)  BP(mean): --  RR: 18 (14 Feb 2025 05:11) (15 - 18)  SpO2: 97% (14 Feb 2025 05:11) (95% - 98%)    Parameters below as of 14 Feb 2025 05:11  Patient On (Oxygen Delivery Method): room air    GENERAL: NAD  HEAD:  Atraumatic, Normocephalic  EYES: EOMI, PERRLA, conjunctiva and sclera clear  ENT: Pharynx not erythematous  PULMONARY: Clear to auscultation bilaterally; No wheeze  CARDIOVASCULAR: Regular rate and rhythm; No murmurs, rubs, or gallops  ABDOMEN: Soft, Nontender, Nondistended; Bowel sounds present  EXTREMITIES:  2+ Peripheral Pulses, No clubbing, cyanosis, or edema  PSYCH: AAOx3, normal affect  SKIN: shingles lesions crusting    .  LABS:                         14.4   8.13  )-----------( 217      ( 13 Feb 2025 12:27 )             45.1     02-13    138  |  102  |  11  ----------------------------<  138[H]  5.2   |  25  |  0.76    Ca    9.5      13 Feb 2025 12:27    TPro  6.9  /  Alb  4.0  /  TBili  0.6  /  DBili  x   /  AST  44[H]  /  ALT  35  /  AlkPhos  58  02-13      Urinalysis Basic - ( 13 Feb 2025 12:27 )    Color: x / Appearance: x / SG: x / pH: x  Gluc: 138 mg/dL / Ketone: x  / Bili: x / Urobili: x   Blood: x / Protein: x / Nitrite: x   Leuk Esterase: x / RBC: x / WBC x   Sq Epi: x / Non Sq Epi: x / Bacteria: x            RADIOLOGY, EKG & ADDITIONAL TESTS: Reviewed.

## 2025-02-20 NOTE — PROGRESS NOTE ADULT - PROVIDER SPECIALTY LIST ADULT
Hospitalist
Hospitalist
Podiatry
Hospitalist

## 2025-02-20 NOTE — DISCHARGE NOTE NURSING/CASE MANAGEMENT/SOCIAL WORK - NSDCFUADDAPPT_GEN_ALL_CORE_FT
Podiatry Discharge Instructions:  Follow up: Please follow up with Dr. Guzman within 1 week of discharge from the hospital, please call [phone] for appointment and discuss that you recently were seen in the hospital.  Wound Care: Please leave your dressing clean dry intact until your follow up appointment  Weight bearing: Please do not weight bear to the right lower extremity   Antibiotics: Please continue as instructed.

## 2025-02-20 NOTE — DISCHARGE NOTE NURSING/CASE MANAGEMENT/SOCIAL WORK - FINANCIAL ASSISTANCE
[facility] provides services at a reduced cost to those who are determined to be eligible through [facility]’s financial assistance program. Information regarding [facility]’s financial assistance program can be found by going to [email or web address] or by calling [phone].

## 2025-02-20 NOTE — DISCHARGE NOTE NURSING/CASE MANAGEMENT/SOCIAL WORK - NSDCPEFALRISK_GEN_ALL_CORE
For information on Fall & Injury Prevention, visit: [email or web address] OR  [email or web address] OR  https://www.cdc.gov/steadi/patient.html

## 2025-02-20 NOTE — PROGRESS NOTE ADULT - ASSESSMENT
87F 2/18 s/p Right foot and ankle fracture ORIF  - Patient seen and evaluated   - Afebrile, Neurovascular status intact   - 2/18 s/p R ankle ORIF and 5th metatarsal ORIF, splint left clean dry and intact  - Patient to remain nonweightbearing to right lower extremity.   - family rec neuro consult for back/nerve pain  - R lower extremity elevate with 2 pillows   - Stable for discharge from podiatry standpoint   - Follow up info in discharge note provider under follow up   - Discussed with attending

## 2025-02-20 NOTE — PROGRESS NOTE ADULT - PROBLEM SELECTOR PROBLEM 1
Ankle fracture

## 2025-04-28 ENCOUNTER — APPOINTMENT (OUTPATIENT)
Dept: INTERNAL MEDICINE | Facility: CLINIC | Age: 85
End: 2025-04-28
Payer: MEDICARE

## 2025-04-28 VITALS
DIASTOLIC BLOOD PRESSURE: 70 MMHG | SYSTOLIC BLOOD PRESSURE: 120 MMHG | WEIGHT: 169 LBS | BODY MASS INDEX: 30.32 KG/M2 | HEIGHT: 62.6 IN

## 2025-04-28 DIAGNOSIS — M51.26 OTHER INTERVERTEBRAL DISC DISPLACEMENT, LUMBAR REGION: ICD-10-CM

## 2025-04-28 DIAGNOSIS — E78.00 PURE HYPERCHOLESTEROLEMIA, UNSPECIFIED: ICD-10-CM

## 2025-04-28 DIAGNOSIS — I82.409 ACUTE EMBOLISM AND THROMBOSIS OF UNSPECIFIED DEEP VEINS OF UNSPECIFIED LOWER EXTREMITY: ICD-10-CM

## 2025-04-28 DIAGNOSIS — M48.07 SPINAL STENOSIS, LUMBOSACRAL REGION: ICD-10-CM

## 2025-04-28 PROCEDURE — G2211 COMPLEX E/M VISIT ADD ON: CPT

## 2025-04-28 PROCEDURE — 99214 OFFICE O/P EST MOD 30 MIN: CPT

## 2025-04-28 RX ORDER — APIXABAN 5 MG/1
5 TABLET, FILM COATED ORAL
Qty: 180 | Refills: 0 | Status: ACTIVE | COMMUNITY
Start: 2025-04-28 | End: 1900-01-01

## 2025-05-05 ENCOUNTER — APPOINTMENT (OUTPATIENT)
Dept: VASCULAR SURGERY | Facility: CLINIC | Age: 85
End: 2025-05-05
Payer: MEDICARE

## 2025-05-05 VITALS
TEMPERATURE: 98.2 F | WEIGHT: 160 LBS | HEART RATE: 97 BPM | DIASTOLIC BLOOD PRESSURE: 68 MMHG | SYSTOLIC BLOOD PRESSURE: 124 MMHG | HEIGHT: 62 IN | BODY MASS INDEX: 29.44 KG/M2

## 2025-05-05 DIAGNOSIS — I87.2 VENOUS INSUFFICIENCY (CHRONIC) (PERIPHERAL): ICD-10-CM

## 2025-05-05 PROCEDURE — 99214 OFFICE O/P EST MOD 30 MIN: CPT

## 2025-05-05 PROCEDURE — 93970 EXTREMITY STUDY: CPT

## 2025-05-05 RX ORDER — APIXABAN 5 MG/1
5 TABLET, FILM COATED ORAL
Qty: 60 | Refills: 3 | Status: ACTIVE | COMMUNITY
Start: 2025-05-05 | End: 1900-01-01

## 2025-05-08 ENCOUNTER — NON-APPOINTMENT (OUTPATIENT)
Age: 85
End: 2025-05-08

## 2025-07-16 PROBLEM — K59.00 CONSTIPATION: Status: ACTIVE | Noted: 2025-07-16

## 2025-07-16 RX ORDER — SENNOSIDES 8.6 MG/1
8.6 TABLET ORAL AT BEDTIME
Qty: 90 | Refills: 0 | Status: ACTIVE | COMMUNITY
Start: 2025-07-16 | End: 1900-01-01

## 2025-07-25 ENCOUNTER — APPOINTMENT (OUTPATIENT)
Dept: INTERNAL MEDICINE | Facility: CLINIC | Age: 85
End: 2025-07-25
Payer: MEDICARE

## 2025-07-25 VITALS — WEIGHT: 160 LBS | HEIGHT: 62 IN | BODY MASS INDEX: 29.44 KG/M2

## 2025-07-25 DIAGNOSIS — I25.84 ATHEROSCLEROTIC HEART DISEASE OF NATIVE CORONARY ARTERY W/OUT ANGINA PECTORIS: ICD-10-CM

## 2025-07-25 DIAGNOSIS — I25.10 ATHEROSCLEROTIC HEART DISEASE OF NATIVE CORONARY ARTERY W/OUT ANGINA PECTORIS: ICD-10-CM

## 2025-07-25 PROCEDURE — 36415 COLL VENOUS BLD VENIPUNCTURE: CPT

## 2025-07-25 PROCEDURE — G2211 COMPLEX E/M VISIT ADD ON: CPT

## 2025-07-25 PROCEDURE — 99214 OFFICE O/P EST MOD 30 MIN: CPT

## 2025-07-25 RX ORDER — TIRZEPATIDE 2.5 MG/.5ML
2.5 INJECTION, SOLUTION SUBCUTANEOUS
Qty: 1 | Refills: 1 | Status: ACTIVE | COMMUNITY
Start: 2025-07-25 | End: 1900-01-01

## 2025-07-25 RX ORDER — DARIDOREXANT 25 MG/1
25 TABLET, FILM COATED ORAL
Qty: 30 | Refills: 0 | Status: ACTIVE | COMMUNITY
Start: 2025-07-25 | End: 1900-01-01

## 2025-07-30 ENCOUNTER — APPOINTMENT (OUTPATIENT)
Dept: VASCULAR SURGERY | Facility: CLINIC | Age: 85
End: 2025-07-30
Payer: MEDICARE

## 2025-07-30 ENCOUNTER — NON-APPOINTMENT (OUTPATIENT)
Age: 85
End: 2025-07-30

## 2025-07-30 VITALS
TEMPERATURE: 98 F | SYSTOLIC BLOOD PRESSURE: 137 MMHG | WEIGHT: 155 LBS | HEART RATE: 80 BPM | HEIGHT: 62 IN | BODY MASS INDEX: 28.52 KG/M2 | DIASTOLIC BLOOD PRESSURE: 74 MMHG

## 2025-07-30 DIAGNOSIS — I87.2 VENOUS INSUFFICIENCY (CHRONIC) (PERIPHERAL): ICD-10-CM

## 2025-07-30 DIAGNOSIS — I82.409 ACUTE EMBOLISM AND THROMBOSIS OF UNSPECIFIED DEEP VEINS OF UNSPECIFIED LOWER EXTREMITY: ICD-10-CM

## 2025-07-30 PROCEDURE — 99213 OFFICE O/P EST LOW 20 MIN: CPT

## 2025-07-30 PROCEDURE — 93970 EXTREMITY STUDY: CPT

## (undated) DEVICE — SUT MONOSOF 3-0 18" C-14

## (undated) DEVICE — WARMING BLANKET UPPER ADULT

## (undated) DEVICE — GLV 8.5 PROTEXIS (WHITE)

## (undated) DEVICE — TOURNIQUET CUFF 34" DUAL PORT W PLC

## (undated) DEVICE — DRILL BIT STRYKER 2.6MM

## (undated) DEVICE — POSITIONER CARDIAC BUMP

## (undated) DEVICE — SUCTION YANKAUER NO CONTROL VENT

## (undated) DEVICE — GLV 8 PROTEXIS (WHITE)

## (undated) DEVICE — MARKING PEN W RULER

## (undated) DEVICE — MEDICATION LABELS W MARKER

## (undated) DEVICE — SUT POLYSORB 2-0 30" GS-21 UNDYED

## (undated) DEVICE — GLV 7.5 PROTEXIS (WHITE)

## (undated) DEVICE — PACK EXTREMITY

## (undated) DEVICE — GLV 6.5 PROTEXIS (WHITE)

## (undated) DEVICE — DRSG ACE BANDAGE 4" NS

## (undated) DEVICE — GLV 7 PROTEXIS (WHITE)

## (undated) DEVICE — SOL IRR POUR NS 0.9% 500ML

## (undated) DEVICE — DRSG WEBRIL 4"

## (undated) DEVICE — DRSG STOCKINETTE IMPERVIOUS LG 9 X 48"

## (undated) DEVICE — DRSG ADAPTIC CURITY OIL EMULSION 3 X 8"

## (undated) DEVICE — DRAPE IOBAN 23" X 23"

## (undated) DEVICE — DRSG WEBRIL 6"

## (undated) DEVICE — SOL IRR BAG NS 0.9% 3000ML

## (undated) DEVICE — DRAPE U (CLEAR) 47 X 51" NON STERILE

## (undated) DEVICE — POSITIONER FOAM EGG CRATE ULNAR 2PCS (PINK)

## (undated) DEVICE — VENODYNE/SCD SLEEVE CALF MEDIUM

## (undated) DEVICE — SOL IRR POUR H2O 250ML

## (undated) DEVICE — GOWN TRIMAX LG

## (undated) DEVICE — SAW BLADE MICROAIRE SAGITTAL 9.4MMX25.4MMX0.6MM

## (undated) DEVICE — BLADE SCALPEL SAFETYLOCK #10

## (undated) DEVICE — STRYKER PULSE LAVAGE WITH HIGH FLOW TIP

## (undated) DEVICE — VISITEC 4X4

## (undated) DEVICE — DRAPE C ARM UNIVERSAL